# Patient Record
Sex: MALE | Race: WHITE | NOT HISPANIC OR LATINO | Employment: FULL TIME | URBAN - METROPOLITAN AREA
[De-identification: names, ages, dates, MRNs, and addresses within clinical notes are randomized per-mention and may not be internally consistent; named-entity substitution may affect disease eponyms.]

---

## 2019-03-02 ENCOUNTER — HOSPITAL ENCOUNTER (OUTPATIENT)
Dept: OTHER | Facility: HOSPITAL | Age: 29
Discharge: HOME | End: 2019-03-02

## 2019-03-02 LAB
ANION GAP SERPL CALC-SCNC: 14 MMOL/L (ref 8–19)
BUN SERPL-MCNC: 11 MG/DL (ref 5–25)
BUN/CREAT SERPL: 14 {RATIO} (ref 6–20)
CALCIUM SERPL-MCNC: 9 MG/DL (ref 8.7–10.4)
CHLORIDE SERPL-SCNC: 100 MMOL/L (ref 99–111)
CONV CO2: 28 MMOL/L (ref 22–32)
CREAT UR-MCNC: 0.76 MG/DL (ref 0.7–1.2)
EST. AVERAGE GLUCOSE BLD GHB EST-MCNC: 226 MG/DL
GFR SERPLBLD BASED ON 1.73 SQ M-ARVRAT: >60 ML/MIN/{1.73_M2}
GLUCOSE SERPL-MCNC: 326 MG/DL (ref 70–99)
HBA1C MFR BLD: 9.5 % (ref 3.5–5.7)
OSMOLALITY SERPL CALC.SUM OF ELEC: 298 MOSM/KG (ref 273–304)
POTASSIUM SERPL-SCNC: 4.2 MMOL/L (ref 3.5–5.3)
SODIUM SERPL-SCNC: 138 MMOL/L (ref 135–147)
TSH SERPL-ACNC: 1.23 M[IU]/L (ref 0.27–4.2)

## 2020-04-29 ENCOUNTER — OFFICE VISIT CONVERTED (OUTPATIENT)
Dept: DIABETES SERVICES | Facility: HOSPITAL | Age: 30
End: 2020-04-29
Attending: NURSE PRACTITIONER

## 2020-06-11 ENCOUNTER — HOSPITAL ENCOUNTER (OUTPATIENT)
Dept: DIABETES SERVICES | Facility: HOSPITAL | Age: 30
Discharge: HOME | End: 2020-06-11
Attending: NURSE PRACTITIONER

## 2020-06-11 ENCOUNTER — OFFICE VISIT CONVERTED (OUTPATIENT)
Dept: DIABETES SERVICES | Facility: HOSPITAL | Age: 30
End: 2020-06-11
Attending: NURSE PRACTITIONER

## 2020-09-15 ENCOUNTER — HOSPITAL ENCOUNTER (OUTPATIENT)
Dept: OTHER | Facility: HOSPITAL | Age: 30
Discharge: HOME | End: 2020-09-15
Attending: NURSE PRACTITIONER

## 2020-09-15 LAB
EST. AVERAGE GLUCOSE BLD GHB EST-MCNC: 212 MG/DL
HBA1C MFR BLD: 9 % (ref 3.5–5.7)

## 2020-09-16 ENCOUNTER — HOSPITAL ENCOUNTER (OUTPATIENT)
Dept: DIABETES SERVICES | Facility: HOSPITAL | Age: 30
Discharge: HOME | End: 2020-09-16
Attending: NURSE PRACTITIONER

## 2020-09-16 ENCOUNTER — OFFICE VISIT CONVERTED (OUTPATIENT)
Dept: DIABETES SERVICES | Facility: HOSPITAL | Age: 30
End: 2020-09-16
Attending: NURSE PRACTITIONER

## 2020-11-04 ENCOUNTER — OFFICE VISIT CONVERTED (OUTPATIENT)
Dept: DIABETES SERVICES | Facility: HOSPITAL | Age: 30
End: 2020-11-04
Attending: NURSE PRACTITIONER

## 2021-05-28 VITALS
SYSTOLIC BLOOD PRESSURE: 144 MMHG | RESPIRATION RATE: 20 BRPM | DIASTOLIC BLOOD PRESSURE: 70 MMHG | BODY MASS INDEX: 23.77 KG/M2 | WEIGHT: 169.75 LBS | HEIGHT: 71 IN

## 2021-05-28 NOTE — PROGRESS NOTES
Patient: ERIC BANUELOS     Acct: [id number]     Report: #[id number]  UNIT #: [id number]     : 1990    Encounter Date:2020  PRIMARY CARE:   ***ISigned***  --------------------------------------------------------------------------------------------------------------------  Date of Encounter      2020            Chief Complaint      DIABETES MELLITUS TYPE I            Referring Providers/Copies To      Referring Provider:  LYRIC            Allergies      Coded Allergies:             NO KNOWN ALLERGIES (Verified , 11/15/16)            Medications      Last Reconciled on 5/3/20 11:14 pm by JOEL SKAGGS      Insulin Lispro (HumaLOG KWIKPEN 100 UNITS/ML) 100 Units/Ml Insuln.pen      20 UNITS SUBQ TID INSULIN, #1 BOX 5 Refills         Prov: JOEL SKAGGS         20       Insulin Glargine (Lantus SOLOSTAR) 100 Unit/1 Ml Insuln.pen      50 UNITS SUBQ HS, #1 BOX 5 Refills         Prov: JOEL SKAGGS         20       Lisinopril* (Lisinopril*) Unknown Strength Tablet      PO QDAY, #30 TAB 0 Refills         Reported         20            Vital Signs      Height 5 ft 11 in / 180.34 cm      Weight 169 lbs 12.067 oz / 77.0 kg      BSA 1.97 m2      BMI 23.7 kg/m2      Respirations 20      Blood Pressure 144/70            Eye Exam      When was your last eye exam?      Not long ago but unknown      Where was it done?      [facility]            Preventative      Hx Influenza Vaccination:  Yes (LAST SEASON)      Date Influenza Vaccine Given:  Oct 1, 2019      Influenza Vaccine Declined:  No      Have You Had 2 or More Falls i:  No      Have You Had a Fall with an In:  No      Hx Pneumococcal Vaccination:  Yes ()      Encouraged to follow-up with:  PCP regarding preventative exams.      Chart initiated by:      Angella Guo MA            HPI - Diabetes      This patient is seen in the office today for follow-up evaluation for diabetes     medication management.  He is a  29-year-old male patient with a history of type     1 diabetes uncontrolled.  He is currently managed using Lantus 50 units once a     day and Humalog up to 20 units 3 times a day with meals.  He adjust his insulin     based on his carbohydrate intake and his glucose levels.  He brings blood     glucose logs to the office today indicating testing 3-4 times every day.      Glucose levels are widely fluctuating and ranging from as low as 45 mg/dL to as     high as 444 mg/dL.  The patient states that he just recently returned to work on    night shift and feels like this is why his glucose levels are fluctuating so     wildly.  He feels like he is trying to get his mealtimes reset and his insulin     dosing reset to accommodate the current work schedule.  Right now he states he     takes his Lantus at 930 to 10:00 in the evening and has been experiencing some     lows at work.  He may be experiencing a peak effect of his Lantus while he is at    work.              He denies any other health issues or concerns at this            Most Recent Lab Findings      Most Recent HGA1C      The patient had orders mailed to him after his last telehealth visit so he could    get a current A1c and urine microalbumin but he has not had this collected      Diabetes Type:  Type 1      Diabetes Complications:  Retinopathy (Reports some mild onset of)      Dietary Habits:  2 meals daily, Snacks, Diet Drinks      Exercise:  None      BG Monitoring:  Meter      Frequency:  Times per day (4-5)      Blood Glucose Range:         mg/dL her glucose logs            PAST,FAMILY,   Past Medical History      Past Medical History:  None            Past Surgical History      Past Surgical History:  None            Past Family History      TYPE 1 DM (Sister), TYPE 2 DM (Paternal grandfather and paternal great     grandfather)            Social History      Marrital Status:        Lives independently:  No      Number of Children:  2             Tobacco Use      Smoking status:  Former smoker            Vaping      Currently Vaping:  Yes            Alcohol Use      None            Substance Use      Substance Use:  Denies Use            Review of Sytems      General:  No Appetite Changes, No Fatigue, No Weight Loss, No Weight Gain      Eyes:  Negative for Blurred Vision; Positive for Corrective Lenses; Negative for    Vision Changes      Cardiovascular:  No Chest Pain, No Palpitations      Gastrointestinal:  No Constipation, No Diarrhea, No Nausea/Vomiting      Integumentary:  No Lesions, No Rash, No Wounds      Neurologic:  No Extremity Pain, No Numbness, No Tingling      Psychiatric:  No Anxiety, No Depression      Endocrine:  Hypoglycemia (He is having some lows while at work and with     increased physical activity); No Hypo Unawareness; Nocturnal Hypo; No Polyuria,     No Polyphagia, No Polydipsia            Exam      General:  Awake and Alert, Appropriately dressed/groomed, No Acute Distress,       Eyes:  Sclera Non-Icteric, EOM Intact, Eyelids without swelling,       Cardiovascular:  No Peripheral Edema, Normal Chest Appearance,       Musculoskeletal:  Steady Gait, Normal Strength, Normal ROM,       Respiratory:  No Respiratory Distress, No Cyanosis, No use of Accessory Musc,       Skin:  No Blisters, No Cuts\Scrapes, No Acanthosis Nigricans, No DM Dermopathy,     No Fungus, No NLD, No Rash, No Vitiligo      Psychiatric:  Appropriate Affect, Intact Judgement, Oriented x 3,             Impression      Type 1 diabetes uncontrolled with mild diabetic retinopathy            Diagnosis      TYPE 1 DIABETES MELLITUS WITH HYPERGLYCEMIA - E10.65            Notes      Renewed Medications      * INSULIN LISPRO (HumaLOG KWIKPEN 100 UNITS/ML) 100 UNITS/ML INSULN.PEN: 20       UNITS SUBQ TID INSULIN #1         Instructions: take up to 20 units with each meal         Dx: TYPE 1 DIABETES MELLITUS WITH HYPERGLYCEMIA - E10.65            Plan      At this time the  patient was instructed to decrease his Lantus to 45 units and     remove the time of the insulin to around the suppertime meal will prevent     hypoglycemia while he is at work.  If he has fasting elevated glucose levels he     will increase the Lantus back up to 50 units.  He will continue to monitor his     glucose levels 4-5 times each day.  We will schedule the patient for a return     visit in approximately 6 weeks for reevaluation.  No other changes were made.      He was reminded to get his lab work collected.            Patient Education      Patient Education Provided:  Yes            Topics Patient Counseled on      Meds            Electronically signed by JOEL SKAGGS  06/19/2020 16:20       Disclaimer: Converted document may not contain table formatting or lab diagrams. Please see [employer] for the authenticated document.

## 2021-05-28 NOTE — PROGRESS NOTES
Patient: ERIC BANUELOS     Acct: [id number]     Report: #[id number]  UNIT #: [id number]     : 1990    Encounter Date:2020  PRIMARY CARE:   ***ISigned***  --------------------------------------------------------------------------------------------------------------------  Date of Encounter      Sep 16, 2020            Chief Complaint      DIABETES MELLITUS TYPE I            Allergies      Coded Allergies:             NO KNOWN ALLERGIES (Verified , 11/15/16)            Medications      Last Reconciled on 20 8:45 am by JOEL SKAGGS      Insulin Lispro (HumaLOG KWIKPEN 100 UNITS/ML) 100 Units/Ml Insuln.pen      20 UNITS SUBQ TID INSULIN for 90 Days, #18 INSULN.PEN 3 Refills         Prov: JOEL SKAGGS         20       Insulin Glargine (Lantus SOLOSTAR) 100 Unit/1 Ml Insuln.pen      50 UNITS SUBQ HS, #1 BOX 5 Refills         Prov: JOEL SKAGGS         20       Lisinopril* (Lisinopril*) Unknown Strength Tablet      PO QDAY, #30 TAB 0 Refills         Reported         20            Eye Exam      When was your last eye exam?:              Where was it done?:        [facility]            Pain Score      Experiencing any pain?:  No            Preventative      Hx Influenza Vaccination:  No      Influenza Vaccine Declined:  Yes      Have You Had 2 or More Falls i:  No      Have You Had a Fall with an In:  No      Hx Pneumococcal Vaccination:  Yes ()      Encouraged to follow-up with:  PCP regarding preventative exams.      Chart initiated by:      Angella Guo MA            HPI - Diabetes      This patient is seen in the office today for follow-up evaluation for diabetes     medication management.  He is a 30-year-old male patient with history of type 1     diabetes complicated by diabetic retinopathy.  The patient reports his glucose     levels are remaining elevated in the 180-190 range on most occasions.  The     patient attributes this to a chaotic schedule since  resuming work.  He states he    is only getting approximately 4 hours of sleep most days and then gets up and     stays with his children and does home schooling then tries to get a little bit     more sleep before he goes back to work.  He is eating at random times.  He also     states he has a lot of stress and extra workload at work.  He denies any other     health issues or concerns since last being seen.            Most Recent Lab Findings      Most Recent HGA1C      His most recent A1c was collected on 9/15/2020 and was 9.0% indicating     uncontrolled type 1 diabetes.  This is, however an improvement in the A1c down     from 9.5%            Item Value  Date Time             Hemoglobin A1c 9.0 % H 9/15/20 0811            Diabetes Type:  Type 1      Diabetes Complications:  Retinopathy (He denies any changes in this condition)      Hospitalizations 2nd to DM?:  Yes      ER/911 Secondary to DM:  Yes      Dietary Habits:  2 meals daily, Snacks, Carb Count, Diet Drinks      Exercise:  None      BG Monitoring:  Meter      Frequency:  Times per day (3)      Blood Glucose Range:        staying in the 180-190's            PAST,FAMILY,   Past Medical History      Past Medical History:  None            Past Surgical History      Past Surgical History:  None      Other Past Surgical History      Ear tube placement            Past Family History      TYPE 1 DM, TYPE 2 DM            Social History      Marrital Status:        Lives independently:  No      Number of Children:  2      Occupation:  Plating Control            Tobacco Use      Smoking status:  Former smoker      Smoking history:  < 10 pack years            Vaping      Currently Vaping:  Yes            Alcohol Use      None            Substance Use      Substance Use:  Denies Use            Review of Sytems      General:  No Appetite Changes; Fatigue (He complains of generalized fatigue all     the time); No Weight Loss, No Weight Gain      Eyes:   Negative for Blurred Vision, Negative for Corrective Lenses, Negative for    Vision Changes      Cardiovascular:  No Chest Pain, No Palpitations      Gastrointestinal:  No Constipation, No Diarrhea, No Nausea/Vomiting      Integumentary:  No Lesions, No Rash, No Wounds      Neurologic:  No Extremity Pain, No Numbness, No Tingling      Psychiatric:  No Anxiety, No Depression      Endocrine:  Hypoglycemia (He denies any problematic hypoglycemia but states he     has a rare occasion), Hypo Unawareness (He has a history of hypoglycemia unaware    ness), Nocturnal Hypo (He denies any current nocturnal hypoglycemia but has a     history); No Polyuria, No Polyphagia, No Polydipsia            Exam      General:  Awake and Alert, Appropriately dressed/groomed, No Acute Distress,       Eyes:  Sclera Non-Icteric, EOM Intact, Eyelids without swelling,       Cardiovascular:  No Peripheral Edema, Normal Chest Appearance, Hear Tones Normal    S1 S2,       Musculoskeletal:  Steady Gait, Normal Strength, Normal ROM,       Respiratory:  No Respiratory Distress, No Cyanosis, No use of Accessory Musc,       Skin:  No Blisters, No Cuts\Scrapes, No Acanthosis Nigricans, No DM Dermopathy,     No Fungus, No NLD, No Rash, No Vitiligo      Psychiatric:  Appropriate Affect, Intact Judgement, Oriented x 3,             Impression      Type 1 diabetes uncontrolled            Diagnosis      Notes      Changed Medications      * INSULIN LISPRO (HumaLOG KWIKPEN 100 UNITS/ML) 100 UNITS/ML INSULN.PEN:         From: 20 UNITS SUBQ TID INSULIN #1         To: 20 UNITS SUBQ TID INSULIN 90 Days #18         Instructions: take up to 20 units with each meal         Dx: TYPE 1 DIABETES MELLITUS WITH HYPERGLYCEMIA - E10.65            Plan      Patient was instructed to increase his Lantus to 55 units once a day and then     monitor glucose levels.  If pre-meal and fasting glucose levels remain above 150    he will increase again by 5 units in 5 days.  We will  schedule the patient for     follow-up appointment in our office in 6 to 8 weeks.  He will contact our office    if he has any problematic glucose levels.            Electronically signed by JOEL SKAGGS  09/16/2020 08:45       Disclaimer: Converted document may not contain table formatting or lab diagrams. Please see [employer] for the authenticated document.

## 2021-05-28 NOTE — PROGRESS NOTES
Patient: MOY BANUELOS     Acct: [id number]     Report: #[id number]  UNIT #: [id number]     : 1990    Encounter Date:2020  PRIMARY CARE:   ***Signed***  --------------------------------------------------------------------------------------------------------------------  History of Present Illness            Chief Complaint: Type 1 diabetes uncontrolled            Moy Banuelos is presenting for evaluation via Video and Audio conferencing.     Verbal consent obtained via Video and Audio before beginning the visit.            Provider spent 28 minutes with the patient during telehealth visit.            The following staff were present during the visit: KENJI Gaspar and     Robert Nielsen RN                         Overview of Symptoms      This patient is seen in the office today for evaluation for diabetes medication     management.  He is a 29-year-old male patient with a history of type 1 diabetes     uncontrolled.  He has a 15-year history of type 1 diabetes.  This patient was     seen several years ago in our office for insulin pump management but he is no     longer using the pump.  He has most recently been managed by Dr. Cochran who     recently retired.  He is asking us to resume care of his diabetes.            The patient states he is currently managed using Lantus 50 units every day and     Humalog 10 to 20 units with each meal based on his carbohydrate intake and blood    glucose levels.  He states he is testing his blood glucose 4-5 times each day.      Typically glucose levels have run between 150 and 200 however the patient states    that he has been out of his Lantus insulin for 2 days now and his fasting     glucose levels are above 200.  He states random glucose levels are running as     normal since he is using his Humalog.  He reports that he has rare episodes of     hypoglycemia.            He states he eats approximately 2 meals a day with occasional snacks and  denies     use of sugary drinks.            He reports he has been recently diagnosed with some retinopathy in the right eye    but otherwise denies other complications due to his diabetes.  He denies any     recent hospitalizations or emergency department visits due to his diabetes.  He     does states he is currently recovering from drainage of a pilonidal cyst on his     tailbone.            Allergies and Medications      Allergies:        Coded Allergies:             NO KNOWN ALLERGIES (Verified , 11/15/16)      Medications    Last Reconciled on 5/3/20 11:14 pm by JOEL SKAGGS      Insulin Lispro (HumaLOG KWIKPEN 100 UNITS/ML) 100 Units/Ml Insuln.pen      20 UNITS SUBQ TID INSULIN, #1 BOX 0 Refills         Prov: JOEL SKAGGS         4/29/20       Insulin Glargine (Lantus SOLOSTAR) 100 Unit/1 Ml Insuln.pen      50 UNITS SUBQ HS, #1 BOX 5 Refills         Prov: JOEL SKAGGS         4/29/20       Lisinopril* (Lisinopril*) Unknown Strength Tablet      PO QDAY, #30 TAB 0 Refills         Reported         4/29/20            Past Medical,Surg,Family Hx      Past Medical History:  Diabetes Type 1      Family History:  Type II Dm (maternal grandfather; paternal great grandfather)      Other History      pilonidal cyst            Social History      Smoking status:  Former smoker      Currently Vaping:  Yes            Review of Systems      General:  Denies: Appetite Change, Fatigue, Fever, Night Sweats, Weight Gain,     Weight Loss      ENT:  Denies Headache, Denies Hearing Loss, Denies Hoarseness, Denies Sore     Throat      Eye:  Denies Blurred Vision, Denies Corrective Lenses, Denies Diplopia, Denies     Vision Changes      Cardiovascular:  Denies Chest Pain, Denies Palpitations      Respiratory:  Denies: Cough, Coughing Blood, Productive Cough, Shortness of Air,    Wheezing      Gastrointestinal:  Denies Bloody Stools, Denies Constipation, Denies Diarrhea,     Denies Nausea/Vomiting, Denies Problem Swallowing,  Denies Unable to Control [place]    els      Genitourinary:  Denies Blood in Urine, Denies Incontinence, Denies Painful     Urination      Musculoskeletal:  Denies Back Pain, Denies Muscle Pain, Denies Painful Joints      Integumentary:  Denies Itching, Denies Lesions, Denies Rash      Neurologic:  Denies Dizziness, Denies Numbness\Tingling, Denies Seizures      Psychiatric:  Denies Anxiety, Denies Depression      Endocrine:  Denies Cold Intolerance, Denies Heat Intolerance      Hematologic/Lymphatic:  Denies Bruising, Denies Bleeding, Denies Enlarged Lymph     Nodes      Other      He is currently employed at [employer]; he is  with 5 children; he denies     use of alcohol or recreational drugs            Exam      Constitutional/Appearance:  Well Nourished, No Acute Distress      Head/Face:  Atraumatic      Eyes:  Extracocular move intact, No Scleral Icterus      Respiratory:  Breathing comfortably, No Cough      Skin: General Appearance:  No Visable Rashes on face, No Lesions on face      Neurologic Orientation:  Grossly orientated to Person, Place, No Facial Drop      Psychiatric:  Normal Mood, Normal Affect            Plan and Patient Instructions      Plan      The patient was instructed to begin using the Humalog insulin with a 1-10     carbohydrate ratio and a 1 to 25 mg/dL correction for glucose levels above 150     mg/dL.  He is to continue monitoring his glucose levels 4-5 times each day and     is asked to log them for review with the patient in approximately 2 weeks.  We     will schedule the patient for a follow-up appointment in 6 weeks however.  In     the meantime we will collect a hemoglobin A1c and urine microalbumin.  Refills     for his Lantus and his Humalog were submitted to his pharmacy.      Instructions      * Chronic conditions reviewed and taken into consideration for today's treatment      plan.      * Patient instructed to seek medical attention urgently for new or worsening        symptoms.      * Patient was educated/instructed on their diagnosis, treatment and medications       prior to discharge from the Video and Audio visit today.            Electronically signed by JOEL SKAGGS  05/03/2020 23:14       Disclaimer: Converted document may not contain table formatting or lab diagrams. Please see [employer] for the authenticated document.

## 2021-07-01 ENCOUNTER — OFFICE VISIT (OUTPATIENT)
Dept: DIABETES SERVICES | Facility: HOSPITAL | Age: 31
End: 2021-07-01

## 2021-07-01 VITALS
WEIGHT: 166.89 LBS | DIASTOLIC BLOOD PRESSURE: 80 MMHG | HEIGHT: 71 IN | OXYGEN SATURATION: 99 % | SYSTOLIC BLOOD PRESSURE: 118 MMHG | TEMPERATURE: 99 F | HEART RATE: 80 BPM | BODY MASS INDEX: 23.36 KG/M2

## 2021-07-01 DIAGNOSIS — E10.9 TYPE 1 DIABETES MELLITUS WITHOUT COMPLICATION (HCC): Primary | ICD-10-CM

## 2021-07-01 LAB
GLUCOSE BLDC GLUCOMTR-MCNC: 69 MG/DL (ref 70–130)
HBA1C MFR BLD: 9.2 %

## 2021-07-01 PROCEDURE — 82962 GLUCOSE BLOOD TEST: CPT | Performed by: NURSE PRACTITIONER

## 2021-07-01 PROCEDURE — 99214 OFFICE O/P EST MOD 30 MIN: CPT | Performed by: NURSE PRACTITIONER

## 2021-07-01 PROCEDURE — 83036 HEMOGLOBIN GLYCOSYLATED A1C: CPT | Performed by: NURSE PRACTITIONER

## 2021-07-01 PROCEDURE — G0463 HOSPITAL OUTPT CLINIC VISIT: HCPCS | Performed by: NURSE PRACTITIONER

## 2021-07-01 RX ORDER — PROCHLORPERAZINE 25 MG/1
1 SUPPOSITORY RECTAL
Qty: 1 EACH | Refills: 3 | Status: SHIPPED | OUTPATIENT
Start: 2021-07-01 | End: 2021-07-09 | Stop reason: SDUPTHER

## 2021-07-01 RX ORDER — PROCHLORPERAZINE 25 MG/1
1 SUPPOSITORY RECTAL ONCE
Qty: 1 EACH | Refills: 0 | Status: SHIPPED | OUTPATIENT
Start: 2021-07-01 | End: 2021-07-01

## 2021-07-01 RX ORDER — PROCHLORPERAZINE 25 MG/1
SUPPOSITORY RECTAL
Qty: 3 EACH | Refills: 11 | Status: SHIPPED | OUTPATIENT
Start: 2021-07-01 | End: 2021-07-09 | Stop reason: SDUPTHER

## 2021-07-01 RX ORDER — INSULIN LISPRO 100 [IU]/ML
10 INJECTION, SOLUTION INTRAVENOUS; SUBCUTANEOUS
COMMUNITY
End: 2021-08-18

## 2021-07-01 RX ORDER — INSULIN DETEMIR 100 [IU]/ML
60 INJECTION, SOLUTION SUBCUTANEOUS DAILY
COMMUNITY
Start: 2021-03-26 | End: 2021-08-18

## 2021-07-01 NOTE — PROGRESS NOTES
Chief Complaint  Diabetes    Referred By: Self Referring    Subjective          Moy Granados presents to [facility] for diabetes medication management    History of Present Illness    Visit type:  follow-up  Diabetes type:  Type 1  Current diabetes status/concerns/issues: He denies any concerns regarding his diabetes today.  He does express interest in an insulin pump.  He has been reviewing different devices and expresses an interest in the tandem insulin pump with a Dexcom continuous glucose sensor.   Diabetes symptoms:  none reported at this time  Diabetes complications:  Retinopathy (small bleed behind right eye now resolved)  Hypoglycemia:  Occasional - had low glucose of 69 upon arrival to office; treated with sprite and peanut butter crackers  Hypoglycemia Symptoms:  shaking/tremors and sweaty  Current diabetes treatment: He is using Levemir 60 units once a day and Humalog with a 1-10 carbohydrate ratio and a 1-25 correction for glucose levels greater than 125  Blood glucose device:  Meter  Blood glucose monitoring frequency:  3 - 4  Blood glucose range/average:  150-200+  Diet:  Carbohydrate Counting, Diet drinks only  Activity:  very busy at work    Past Medical History:   Diagnosis Date   • Type 1 diabetes (CMS/[facility])      Past Surgical History:   Procedure Laterality Date   • EAR TUBES       Family History   Problem Relation Age of Onset   • Diabetes type I Other    • Diabetes type II Other      Social History     Socioeconomic History   • Marital status:      Spouse name: Not on file   • Number of children: 2   • Years of education: Not on file   • Highest education level: Not on file   Tobacco Use   • Smoking status: Former Smoker   • Smokeless tobacco: Current User     Types: Snuff   • Tobacco comment: <10 PACK YEARS   Vaping Use   • Vaping Use: Every day   Substance and Sexual Activity   • Alcohol use: Not Currently   • Drug use: Not Currently     No Known  Allergies    Current Outpatient Medications:   •  Insulin Lispro, 1 Unit Dial, (HUMALOG) 100 UNIT/ML solution pen-injector, Inject 10 Units under the skin into the appropriate area as directed 3 (Three) Times a Day With Meals. Use carb ratio of 1:10; 1:25 for above 125, Disp: , Rfl:   •  Continuous Blood Gluc  (Dexcom G6 ) device, 1 each 1 (One) Time for 1 dose., Disp: 1 each, Rfl: 0  •  Continuous Blood Gluc Sensor (Dexcom G6 Sensor), Every 10 (Ten) Days., Disp: 3 each, Rfl: 11  •  Continuous Blood Gluc Transmit (Dexcom G6 Transmitter) misc, 1 each Every 3 (Three) Months., Disp: 1 each, Rfl: 3  •  Levemir FlexTouch 100 UNIT/ML injection, Inject 60 Units under the skin into the appropriate area as directed Daily., Disp: , Rfl:     Review of Systems   Constitutional: Negative for activity change, appetite change, fatigue, fever, unexpected weight gain and unexpected weight loss.   HENT: Negative for congestion, ear pain, facial swelling, hearing loss, sore throat and tinnitus.    Eyes: Negative for blurred vision, double vision, redness and visual disturbance.   Respiratory: Negative for cough, shortness of breath and wheezing.    Cardiovascular: Negative for chest pain, palpitations and leg swelling.   Gastrointestinal: Negative for abdominal distention, constipation, diarrhea, nausea, vomiting, GERD and indigestion.   Endocrine: Negative for polydipsia, polyphagia and polyuria.   Genitourinary: Negative for difficulty urinating, frequency and urgency.   Musculoskeletal: Negative for back pain, gait problem and myalgias.   Skin: Negative for rash, skin lesions and bruise.   Neurological: Negative for seizures, speech difficulty, weakness, headache and confusion.   Psychiatric/Behavioral: Negative for sleep disturbance, depressed mood and stress. The patient is not nervous/anxious.         Objective     Vitals:    07/01/21 0816   BP: 118/80   BP Location: Right arm   Patient Position: Sitting   Cuff  "Size: Adult   Pulse: 80   Temp: 99 °F (37.2 °C)   TempSrc: Temporal   SpO2: 99%   Weight: 75.7 kg (166 lb 14.2 oz)   Height: 180.3 cm (71\")   PainSc: 0-No pain     Body mass index is 23.28 kg/m².      Physical Exam  Constitutional:       Appearance: Normal appearance. He is normal weight.   HENT:      Head: Normocephalic and atraumatic.      Right Ear: External ear normal.      Left Ear: External ear normal.      Nose: Nose normal.   Eyes:      Extraocular Movements: Extraocular movements intact.      Conjunctiva/sclera: Conjunctivae normal.   Pulmonary:      Effort: Pulmonary effort is normal.   Musculoskeletal:         General: Normal range of motion.      Cervical back: Normal range of motion.   Skin:     General: Skin is warm and dry.   Neurological:      General: No focal deficit present.      Mental Status: He is alert and oriented to person, place, and time. Mental status is at baseline.   Psychiatric:         Mood and Affect: Mood normal.         Behavior: Behavior normal.         Thought Content: Thought content normal.         Judgment: Judgment normal.         Result Review :   The following data was reviewed by: KENJI Girard on 07/01/2021:        A point-of-care A1c was collected in the office today was 9.2% indicating uncontrolled type 1 diabetes.  This is up slightly from a prior result of 9.0% collected in September 2020    Most Recent A1C    HGBA1C Most Recent 7/1/21   Hemoglobin A1C 9.2             A1C Last 3 Results    HGBA1C Last 3 Results 9/15/20 7/1/21   Hemoglobin A1C 9.0 (A) 9.2   (A) Abnormal value       Comments are available for some flowsheets but are not being displayed.                   Assessment:  Diagnoses and all orders for this visit:    1. Type 1 diabetes mellitus without complication (CMS/[facility]) (Primary)  -     POC Glycosylated Hemoglobin (Hb A1C)    Other orders  -     POC Glucose  -     Continuous Blood Gluc Sensor (Dexcom G6 Sensor); Every 10 (Ten) Days.  Dispense: " 3 each; Refill: 11  -     Continuous Blood Gluc  (Dexcom G6 ) device; 1 each 1 (One) Time for 1 dose.  Dispense: 1 each; Refill: 0  -     Continuous Blood Gluc Transmit (Dexcom G6 Transmitter) misc; 1 each Every 3 (Three) Months.  Dispense: 1 each; Refill: 3        Plan: No changes were made to the patient's treatment plan.  We will submit request to his insurance for the Dexcom continuous glucose sensor and a tandem insulin pump.  The patient will call the office to set up time for training upon receipt of the devices.  He will then be scheduled for a follow-up appointment in our office.    The patient will monitor his blood glucose levels 3-4 times each day.  If he experiences any increased frequency or severity of hypoglycemia, or worsening hyperglycemia, he will contact the office for further instructions.          Follow Up     Return will call when pump or CGM received.    Patient was given instructions and counseling regarding his condition or for health maintenance advice. Please see specific information pulled into the AVS if appropriate.     Kym Alfaro, KENJI  07/01/2021

## 2021-07-09 RX ORDER — PROCHLORPERAZINE 25 MG/1
SUPPOSITORY RECTAL
Qty: 3 EACH | Refills: 11 | Status: SHIPPED | OUTPATIENT
Start: 2021-07-09 | End: 2022-08-02

## 2021-07-09 RX ORDER — PROCHLORPERAZINE 25 MG/1
1 SUPPOSITORY RECTAL ONCE
Qty: 1 EACH | Refills: 0 | Status: SHIPPED | OUTPATIENT
Start: 2021-07-09 | End: 2021-07-09

## 2021-07-09 RX ORDER — PROCHLORPERAZINE 25 MG/1
1 SUPPOSITORY RECTAL
Qty: 1 EACH | Refills: 3 | Status: SHIPPED | OUTPATIENT
Start: 2021-07-09 | End: 2022-09-14

## 2021-08-04 ENCOUNTER — EDUCATION (OUTPATIENT)
Dept: DIABETES SERVICES | Facility: HOSPITAL | Age: 31
End: 2021-08-04

## 2021-08-04 DIAGNOSIS — IMO0002 DIABETES MELLITUS TYPE 1, UNCONTROLLED, WITH COMPLICATIONS: Primary | ICD-10-CM

## 2021-08-04 NOTE — PROGRESS NOTES
Moy Ross Granados 31 y.o. presents for inulin pump instructions.  Patient is currently using Dexcom CGM.  Patient was explained and demonstrated in the use of the tandem insulin pump.  Tandem clinical check list was followed and completed.  15-15 hypoglycemic treatment rule was explained.  Patient was instructed to talk to Kym PHILLIP regarding rescue glucagon.  Pamphlets were given to patient regarding rescue glucagon.  [facility] staff contact information was given to patient.  Patient was encouraged to contact staff with problems or concerns.        Time started: 9: 30    Time ended: 10: 30    Isabelle Ewing RN, BSN  08/04/2021

## 2021-08-18 ENCOUNTER — OFFICE VISIT (OUTPATIENT)
Dept: DIABETES SERVICES | Facility: HOSPITAL | Age: 31
End: 2021-08-18

## 2021-08-18 VITALS
BODY MASS INDEX: 24.28 KG/M2 | WEIGHT: 179.23 LBS | SYSTOLIC BLOOD PRESSURE: 119 MMHG | HEART RATE: 88 BPM | TEMPERATURE: 98.4 F | DIASTOLIC BLOOD PRESSURE: 61 MMHG | OXYGEN SATURATION: 100 % | HEIGHT: 72 IN

## 2021-08-18 DIAGNOSIS — E10.65 UNCONTROLLED TYPE 1 DIABETES MELLITUS WITH HYPERGLYCEMIA (HCC): Primary | ICD-10-CM

## 2021-08-18 PROCEDURE — 99213 OFFICE O/P EST LOW 20 MIN: CPT | Performed by: NURSE PRACTITIONER

## 2021-08-18 PROCEDURE — 95251 CONT GLUC MNTR ANALYSIS I&R: CPT | Performed by: NURSE PRACTITIONER

## 2021-08-18 PROCEDURE — G0463 HOSPITAL OUTPT CLINIC VISIT: HCPCS | Performed by: NURSE PRACTITIONER

## 2021-08-18 RX ORDER — ACETAMINOPHEN 500 MG
500 TABLET ORAL EVERY 6 HOURS PRN
COMMUNITY

## 2021-08-18 RX ORDER — GLUCAGON INJECTION, SOLUTION 0.5 MG/.1ML
0.5 INJECTION, SOLUTION SUBCUTANEOUS AS NEEDED
Qty: 0.1 ML | Refills: 3 | Status: SHIPPED | OUTPATIENT
Start: 2021-08-18

## 2021-08-18 NOTE — PROGRESS NOTES
Chief Complaint  Diabetes (follow up/post pump start, no other concerns at this time. )    Referred By: Self Referring    Subjective          Moy Granados presents to [facility] for insulin pump management    History of Present Illness    Visit type:  follow-up  Diabetes type:  Type 1  Current diabetes status/concerns/issues:  He is here today for f/u after starting the Tandem pump  Other health concerns: none reported  Diabetes symptoms:  none reported at this time  Diabetes complications:  Retinopathy (small bleed behind right eye now resolved)  Hypoglycemia:  Frequent - per CGM report  Hypoglycemia Symptoms:  shaking/tremors  Current diabetes treatment:  Tandem insulin pump using Humalog insulin.  He is using around 70 units a day  Blood glucose device:  Dexcom CGM  Blood glucose monitoring frequency:  Continuous per CGM  Blood glucose range/average: The 14-day insulin pump report indicates an average sensor glucose of 143 mg/dL with a high of 319 and a low of 40 mg/dL.  He is using control IQ technology 95% the time.  71% of glucose levels are in target range between 70 and 180 while 23% are above target and 6% are below target.  The majority of hypoglycemic events occurred in the first couple days of being on the pump but have since minimized to some extent.  Some of the hyperglycemic events are due to delays and entering carbohydrate related insulin boluses  Diet:  Carbohydrate Counting, Diet drinks only  Activity:  None    Past Medical History:   Diagnosis Date   • Type 1 diabetes (CMS/[facility])      Past Surgical History:   Procedure Laterality Date   • EAR TUBES       Family History   Problem Relation Age of Onset   • Diabetes type I Other    • Diabetes type II Other      Social History     Socioeconomic History   • Marital status:      Spouse name: Not on file   • Number of children: 2   • Years of education: Not on file   • Highest education level: Not on file    Tobacco Use   • Smoking status: Former Smoker     Packs/day: 0.50     Types: Cigarettes     Quit date: 2019     Years since quittin.6   • Smokeless tobacco: Current User     Types: Snuff   Vaping Use   • Vaping Use: Every day   • Substances: Flavoring   • Devices: Refillable tank   Substance and Sexual Activity   • Alcohol use: Not Currently   • Drug use: Never   • Sexual activity: Defer     No Known Allergies    Current Outpatient Medications:   •  acetaminophen (TYLENOL) 500 MG tablet, Take 500 mg by mouth Every 6 (Six) Hours As Needed for Mild Pain  (prn for head aches and pther minor pain)., Disp: , Rfl:   •  Continuous Blood Gluc Sensor (Dexcom G6 Sensor), Every 10 (Ten) Days., Disp: 3 each, Rfl: 11  •  Continuous Blood Gluc Transmit (Dexcom G6 Transmitter) misc, 1 each Every 3 (Three) Months., Disp: 1 each, Rfl: 3  •  Glucagon (Gvoke HypoPen 2-Pack) 0.5 MG/0.1ML solution auto-injector, Inject 0.5 mg under the skin into the appropriate area as directed As Needed (use for severe hypoglycemia)., Disp: 0.1 mL, Rfl: 3  •  insulin lispro (humaLOG) 100 UNIT/ML injection, Use up to 80 units per day per insulin pump, Disp: 30 mL, Rfl: 5    Review of Systems   Constitutional: Negative for activity change, appetite change, fatigue, fever, unexpected weight gain and unexpected weight loss.   HENT: Negative for congestion, ear pain, facial swelling, hearing loss, sore throat and tinnitus.    Eyes: Negative for blurred vision, double vision, redness and visual disturbance.   Respiratory: Negative for cough, shortness of breath and wheezing.    Cardiovascular: Negative for chest pain, palpitations and leg swelling.   Gastrointestinal: Negative for abdominal distention, constipation, diarrhea, nausea, vomiting, GERD and indigestion.   Endocrine: Negative for polydipsia, polyphagia and polyuria.   Genitourinary: Negative for difficulty urinating, frequency and urgency.   Musculoskeletal: Negative for back pain, gait  "problem and myalgias.   Skin: Negative for rash, skin lesions and bruise.   Neurological: Negative for seizures, speech difficulty, weakness, headache and confusion.   Psychiatric/Behavioral: Negative for sleep disturbance, depressed mood and stress. The patient is not nervous/anxious.         Objective     Vitals:    08/18/21 0825   BP: 119/61   BP Location: Right arm   Patient Position: Sitting   Pulse: 88   Temp: 98.4 °F (36.9 °C)   SpO2: 100%   Weight: 81.3 kg (179 lb 3.7 oz)   Height: 182.9 cm (72\")   PainSc: 0-No pain     Body mass index is 24.31 kg/m².      Physical Exam  Constitutional:       Appearance: Normal appearance. He is normal weight.   HENT:      Head: Normocephalic and atraumatic.      Right Ear: External ear normal.      Left Ear: External ear normal.      Nose: Nose normal.   Eyes:      Extraocular Movements: Extraocular movements intact.      Conjunctiva/sclera: Conjunctivae normal.   Pulmonary:      Effort: Pulmonary effort is normal.   Musculoskeletal:         General: Normal range of motion.      Cervical back: Normal range of motion.   Skin:     General: Skin is warm and dry.   Neurological:      General: No focal deficit present.      Mental Status: He is alert and oriented to person, place, and time. Mental status is at baseline.   Psychiatric:         Mood and Affect: Mood normal.         Behavior: Behavior normal.         Thought Content: Thought content normal.         Judgment: Judgment normal.         Result Review :   The following data was reviewed by: KENJI Girard on 08/18/2021:        His most recent A1c was collected on July 1, 2021 was 9.2% indicating uncontrolled type 1 diabetes    Most Recent A1C    HGBA1C Most Recent 7/1/21   Hemoglobin A1C 9.2             A1C Last 3 Results    HGBA1C Last 3 Results 9/15/20 7/1/21   Hemoglobin A1C 9.0 (A) 9.2   (A) Abnormal value       Comments are available for some flowsheets but are not being displayed.                 "   Assessment:  Diagnoses and all orders for this visit:    1. Uncontrolled type 1 diabetes mellitus with hyperglycemia (CMS/[facility]) (Primary)    Other orders  -     insulin lispro (humaLOG) 100 UNIT/ML injection; Use up to 80 units per day per insulin pump  Dispense: 30 mL; Refill: 5  -     Glucagon (Gvoke HypoPen 2-Pack) 0.5 MG/0.1ML solution auto-injector; Inject 0.5 mg under the skin into the appropriate area as directed As Needed (use for severe hypoglycemia).  Dispense: 0.1 mL; Refill: 3        Plan: We elected to make no changes to the patient's pump settings at this time.  He will focus on timing with his carbohydrate related boluses.  He was given a prescription for glucagon emergency kit.  He was instructed on the use of the device.  We will have the patient return in 2 to 3 weeks for reevaluation to see if some of the hypoglycemia and hyperglycemia have resolved.    The patient will monitor his blood glucose levels using his continuous glucose sensor.  If he experiences any increased frequency or severity of hypoglycemia, or worsening hyperglycemia, he will contact the office for further instructions.          Follow Up     Return in about 3 weeks (around 9/8/2021) for Pump Eval, CGM Follow-up.    Patient was given instructions and counseling regarding his condition or for health maintenance advice. Please see specific information pulled into the AVS if appropriate.     Kym Alfaro, KENJI  08/18/2021

## 2021-09-15 ENCOUNTER — OFFICE VISIT (OUTPATIENT)
Dept: DIABETES SERVICES | Facility: HOSPITAL | Age: 31
End: 2021-09-15

## 2021-09-15 VITALS
HEART RATE: 85 BPM | WEIGHT: 186.51 LBS | BODY MASS INDEX: 25.26 KG/M2 | SYSTOLIC BLOOD PRESSURE: 129 MMHG | OXYGEN SATURATION: 97 % | DIASTOLIC BLOOD PRESSURE: 72 MMHG | TEMPERATURE: 97.8 F | HEIGHT: 72 IN

## 2021-09-15 DIAGNOSIS — E10.65 UNCONTROLLED TYPE 1 DIABETES MELLITUS WITH HYPERGLYCEMIA (HCC): Primary | ICD-10-CM

## 2021-09-15 PROCEDURE — G0463 HOSPITAL OUTPT CLINIC VISIT: HCPCS | Performed by: NURSE PRACTITIONER

## 2021-09-15 PROCEDURE — 95251 CONT GLUC MNTR ANALYSIS I&R: CPT | Performed by: NURSE PRACTITIONER

## 2021-09-15 PROCEDURE — 99213 OFFICE O/P EST LOW 20 MIN: CPT | Performed by: NURSE PRACTITIONER

## 2021-09-15 NOTE — PROGRESS NOTES
Chief Complaint  Diabetes (med refills, no other concerns at this time )    Referred By: Self Referring    Subjective          Moy Granados presents to [facility] for insulin pump management    History of Present Illness    Visit type:  follow-up  Diabetes type:  Type 1  Current diabetes status/concerns/issues: He denies any specific concerns related to his diabetes at this time.  Other health concerns: None reported  Diabetes symptoms:    Polyuria: No   Polydipsia: No   Polyphagia:No   Blurred vision: No   Excessive fatigue: No  Diabetes complications:  Neuropathy:No  Nephropathy:No  Retinopathy:Yes, He had a small bleed in his right eye that has resolved  Amputation/Wounds:No  Gastroparesis:No  Cardiovascular Disease:No  Erectile Dysfunction:No  Hypoglycemia:  Level 1 hypoglycemia (54 mg/dL - 70 mg/dL); Frequency - The pump report indicates 2% of glucose levels are level 1 hypoglycemia  Hypoglycemia Symptoms:  shaking/tremors and hunger  Current diabetes treatment: He is using a tandem insulin pump with Humalog insulin.  He is using approximately 65 units of insulin each day  Blood glucose device:  Dexcom CGM  Blood glucose monitoring frequency:  Continuous per CGM  Blood glucose range/average:  The insulin pump report indicates an average sensor glucose of 146 mg/dL with a high of 338 and low of 45 mg/dL.  74% of glucose levels are falling in target range between 70 to 180 mg/dL while 24% are above target and 2% are below target.  He is using control IQ technology 96% of the time.  There is some postprandial hyperglycemia which is occurring because of failure to enter carbohydrates at some meals.  Diet:  Carbohydrate Counting, Avoids high carb/sweet foods, Diet drinks only  Activity:  He is very physically active with his work    Past Medical History:   Diagnosis Date   • Type 1 diabetes (CMS/HCC)      Past Surgical History:   Procedure Laterality Date   • EAR TUBES        Family History   Problem Relation Age of Onset   • Diabetes type I Other    • Diabetes type II Other      Social History     Socioeconomic History   • Marital status:      Spouse name: Not on file   • Number of children: 2   • Years of education: Not on file   • Highest education level: Not on file   Tobacco Use   • Smoking status: Former Smoker     Packs/day: 0.50     Types: Cigarettes     Quit date:      Years since quittin.7   • Smokeless tobacco: Current User     Types: Snuff   Vaping Use   • Vaping Use: Every day   • Substances: Flavoring   • Devices: Ref[employer]ide Technologyble tank   Substance and Sexual Activity   • Alcohol use: Not Currently   • Drug use: Never   • Sexual activity: Yes     Partners: Female     Comment: /wife      No Known Allergies    Current Outpatient Medications:   •  acetaminophen (TYLENOL) 500 MG tablet, Take 500 mg by mouth Every 6 (Six) Hours As Needed for Mild Pain  (prn for head aches and pther minor pain)., Disp: , Rfl:   •  Continuous Blood Gluc Sensor (Dexcom G6 Sensor), Every 10 (Ten) Days., Disp: 3 each, Rfl: 11  •  Continuous Blood Gluc Transmit (Dexcom G6 Transmitter) misc, 1 each Every 3 (Three) Months., Disp: 1 each, Rfl: 3  •  Glucagon (Gvoke HypoPen 2-Pack) 0.5 MG/0.1ML solution auto-injector, Inject 0.5 mg under the skin into the appropriate area as directed As Needed (use for severe hypoglycemia)., Disp: 0.1 mL, Rfl: 3  •  insulin lispro (humaLOG) 100 UNIT/ML injection, Use up to 80 units per day per insulin pump, Disp: 30 mL, Rfl: 5    Review of Systems   Constitutional: Negative for activity change, appetite change, fatigue, fever, unexpected weight gain and unexpected weight loss.   HENT: Negative for congestion, ear pain, facial swelling, hearing loss, sore throat and tinnitus.    Eyes: Negative for blurred vision, double vision, redness and visual disturbance.   Respiratory: Negative for cough, shortness of breath and wheezing.    Cardiovascular: Negative  "for chest pain, palpitations and leg swelling.   Gastrointestinal: Negative for abdominal distention, constipation, diarrhea, nausea, vomiting, GERD and indigestion.   Endocrine: Negative for polydipsia, polyphagia and polyuria.   Genitourinary: Negative for difficulty urinating, frequency and urgency.   Musculoskeletal: Negative for back pain, gait problem and myalgias.   Skin: Negative for rash, skin lesions and bruise.   Neurological: Negative for seizures, speech difficulty, weakness, headache and confusion.   Psychiatric/Behavioral: Negative for sleep disturbance, depressed mood and stress. The patient is not nervous/anxious.         Objective     Vitals:    09/15/21 0821   BP: 129/72   BP Location: Right arm   Patient Position: Sitting   Pulse: 85   Temp: 97.8 °F (36.6 °C)   SpO2: 97%   Weight: 84.6 kg (186 lb 8.2 oz)   Height: 182.9 cm (72\")   PainSc: 0-No pain     Body mass index is 25.3 kg/m².    Physical Exam  Constitutional:       Appearance: Normal appearance.      Comments: Overweight with BMI of 25.3   HENT:      Head: Normocephalic and atraumatic.      Right Ear: External ear normal.      Left Ear: External ear normal.      Nose: Nose normal.   Eyes:      Extraocular Movements: Extraocular movements intact.      Conjunctiva/sclera: Conjunctivae normal.   Pulmonary:      Effort: Pulmonary effort is normal.   Musculoskeletal:         General: Normal range of motion.      Cervical back: Normal range of motion.   Skin:     General: Skin is warm and dry.   Neurological:      General: No focal deficit present.      Mental Status: He is alert and oriented to person, place, and time. Mental status is at baseline.   Psychiatric:         Mood and Affect: Mood normal.         Behavior: Behavior normal.         Thought Content: Thought content normal.         Judgment: Judgment normal.         Result Review :   The following data was reviewed by: KENJI Girard on 09/15/2021:        His most recent A1c " was collected on 7/1/2021 and was 9.2% indicating uncontrolled type 1 diabetes.    Most Recent A1C    HGBA1C Most Recent 7/1/21   Hemoglobin A1C 9.2             A1C Last 3 Results    HGBA1C Last 3 Results 7/1/21   Hemoglobin A1C 9.2             Glucose   Date Value Ref Range Status   07/01/2021 69 (L) 70 - 130 mg/dL Final     Comment:     Serial Number: [id number]:  [id number]     His glucose level was low upon presentation of the office today.  He was provided carbohydrates during the visit and glucose level was on the rise as he was leaving to return home.       Assessment:  Diagnoses and all orders for this visit:    1. Uncontrolled type 1 diabetes mellitus with hyperglycemia (CMS/[facility]) (Primary)        Plan: No changes were made to his pump settings.  He is encouraged to enter all carbohydrates into his pump to prevent the postprandial excursions.  He will be scheduled for routine follow-up appointment in 3 months.    The patient will monitor his blood glucose levels using his continuous glucose sensor.  If he develops hypoglycemia or experiences any increased frequency or severity of hypoglycemia, he will contact the office for further instructions.        Follow Up     Return in about 3 months (around 12/15/2021) for Pump Eval, CGM Follow-up.    Patient was given instructions and counseling regarding his condition or for health maintenance advice. Please see specific information pulled into the AVS if appropriate.     Kym Alfaro, KENJI  09/15/2021

## 2021-12-15 ENCOUNTER — OFFICE VISIT (OUTPATIENT)
Dept: DIABETES SERVICES | Facility: HOSPITAL | Age: 31
End: 2021-12-15

## 2021-12-15 VITALS
SYSTOLIC BLOOD PRESSURE: 124 MMHG | RESPIRATION RATE: 22 BRPM | DIASTOLIC BLOOD PRESSURE: 72 MMHG | OXYGEN SATURATION: 100 % | BODY MASS INDEX: 25.14 KG/M2 | TEMPERATURE: 97.2 F | HEART RATE: 86 BPM | WEIGHT: 185.63 LBS | HEIGHT: 72 IN

## 2021-12-15 DIAGNOSIS — E10.9 CONTROLLED DIABETES MELLITUS TYPE 1 WITHOUT COMPLICATIONS (HCC): Primary | ICD-10-CM

## 2021-12-15 LAB
EXPIRATION DATE: ABNORMAL
GLUCOSE BLDC GLUCOMTR-MCNC: 66 MG/DL (ref 70–99)
HBA1C MFR BLD: 6.6 %
Lab: ABNORMAL

## 2021-12-15 PROCEDURE — 82962 GLUCOSE BLOOD TEST: CPT | Performed by: NURSE PRACTITIONER

## 2021-12-15 PROCEDURE — 99213 OFFICE O/P EST LOW 20 MIN: CPT | Performed by: NURSE PRACTITIONER

## 2021-12-15 PROCEDURE — 83036 HEMOGLOBIN GLYCOSYLATED A1C: CPT | Performed by: NURSE PRACTITIONER

## 2021-12-15 PROCEDURE — G0463 HOSPITAL OUTPT CLINIC VISIT: HCPCS | Performed by: NURSE PRACTITIONER

## 2021-12-15 NOTE — PROGRESS NOTES
Chief Complaint  Diabetes (follow up and med refills , pt needs a refill on his lantus bc he is not using his pump at this time, he also needs syringes, (daughter needed dental work,this came first before pump suplies as to why he is not on his pump at this time))    Referred By: Self Referring    Subjective          Moy Granados presents to [facility] for insulin pump management    History of Present Illness    Visit type:  follow-up  Diabetes type:  Type 1  Current diabetes status/concerns/issues: He is currently off of his pump with plans to resume the pump.  He had to wait to get supplies.  Other health concerns: None  reported  Diabetes symptoms:    Polyuria: No   Polydipsia: No   Polyphagia: No   Blurred vision: No   Excessive fatigue: No  Diabetes complications:  Neuropathy:No  Nephropathy:No  Retinopathy:Yes, He had a small bleed in his right eye that has resolved  Amputation/Wounds:No  Gastroparesis:No  Cardiovascular Disease:No  Erectile Dysfunction:No  Hypoglycemia:  His glucose was 66 upon arrival to the office today.  He states it has been quite sometime since his have an episode of hypoglycemia  Hypoglycemia Symptoms:  He does not feel the episode of hypoglycemia occurring at this time  Current diabetes treatment:  He is currently off of his tandem insulin pump for approximately 1 month.  He is taking Lantus 50 units once a day and Humalog using the previous carbohydrate ratio and correction dosing that his pump would ordinarily use.  Blood glucose device:  Dexcom CGM; he is off of his sensor right now  Blood glucose monitoring frequency:  Continuous per CGM; he is testing 3-4 times or more day  Blood glucose range/average: He states glucose levels are typically around 150  Diet:  Carbohydrate Counting, Avoids high carb/sweet foods  Activity/Exercise:  None    Past Medical History:   Diagnosis Date   • Type 1 diabetes (HCC)      Past Surgical History:   Procedure  Laterality Date   • EAR TUBES       Family History   Problem Relation Age of Onset   • Diabetes type I Other    • Diabetes type II Other      Social History     Socioeconomic History   • Marital status:    • Number of children: 2   Tobacco Use   • Smoking status: Former Smoker     Packs/day: 0.50     Types: Cigarettes     Quit date: 2019     Years since quittin.9   • Smokeless tobacco: Current User     Types: Snuff   Vaping Use   • Vaping Use: Every day   • Substances: Flavoring   • Devices: Refillable tank   Substance and Sexual Activity   • Alcohol use: Not Currently   • Drug use: Never   • Sexual activity: Yes     Partners: Female     Comment: /wife      No Known Allergies    Current Outpatient Medications:   •  acetaminophen (TYLENOL) 500 MG tablet, Take 500 mg by mouth Every 6 (Six) Hours As Needed for Mild Pain  (prn for head aches and pther minor pain)., Disp: , Rfl:   •  Continuous Blood Gluc Sensor (Dexcom G6 Sensor), Every 10 (Ten) Days., Disp: 3 each, Rfl: 11  •  Continuous Blood Gluc Transmit (Dexcom G6 Transmitter) misc, 1 each Every 3 (Three) Months., Disp: 1 each, Rfl: 3  •  Glucagon (Gvoke HypoPen 2-Pack) 0.5 MG/0.1ML solution auto-injector, Inject 0.5 mg under the skin into the appropriate area as directed As Needed (use for severe hypoglycemia)., Disp: 0.1 mL, Rfl: 3  •  insulin lispro (humaLOG) 100 UNIT/ML injection, Use up to 80 units per day per insulin pump, Disp: 30 mL, Rfl: 5  •  Insulin Glargine (LANTUS SOLOSTAR) 100 UNIT/ML injection pen, Inject 50 Units under the skin into the appropriate area as directed Every Night., Disp: 5 pen, Rfl: 2    Review of Systems   Constitutional: Negative for activity change, appetite change, fatigue, fever, unexpected weight gain and unexpected weight loss.   HENT: Negative for congestion, ear pain, facial swelling, hearing loss, sore throat and tinnitus.    Eyes: Negative for blurred vision, double vision, redness and visual disturbance.  "  Respiratory: Negative for cough, shortness of breath and wheezing.    Cardiovascular: Negative for chest pain, palpitations and leg swelling.   Gastrointestinal: Negative for abdominal distention, constipation, diarrhea, nausea, vomiting, GERD and indigestion.   Endocrine: Negative for polydipsia, polyphagia and polyuria.   Genitourinary: Negative for difficulty urinating, frequency and urgency.   Musculoskeletal: Negative for back pain, gait problem and myalgias.   Skin: Negative for rash, skin lesions and bruise.   Neurological: Negative for seizures, speech difficulty, weakness, headache and confusion.   Psychiatric/Behavioral: Negative for sleep disturbance, depressed mood and stress. The patient is not nervous/anxious.         Objective     Vitals:    12/15/21 0809   BP: 124/72   BP Location: Left arm   Patient Position: Sitting   Cuff Size: Adult   Pulse: 86   Resp: 22   Temp: 97.2 °F (36.2 °C)   SpO2: 100%   Weight: 84.2 kg (185 lb 10 oz)   Height: 182.9 cm (72\")   PainSc: 0-No pain     Body mass index is 25.18 kg/m².    Physical Exam  Constitutional:       Appearance: Normal appearance. He is normal weight.      Comments: Overweight with BMI of 25.18   HENT:      Head: Normocephalic and atraumatic.      Right Ear: External ear normal.      Left Ear: External ear normal.      Nose: Nose normal.   Eyes:      Extraocular Movements: Extraocular movements intact.      Conjunctiva/sclera: Conjunctivae normal.   Pulmonary:      Effort: Pulmonary effort is normal.   Musculoskeletal:         General: Normal range of motion.      Cervical back: Normal range of motion.   Skin:     General: Skin is warm and dry.   Neurological:      General: No focal deficit present.      Mental Status: He is alert and oriented to person, place, and time. Mental status is at baseline.   Psychiatric:         Mood and Affect: Mood normal.         Behavior: Behavior normal.         Thought Content: Thought content normal.         " Judgment: Judgment normal.         Result Review :   The following data was reviewed by: KENJI Girard on 12/15/2021:    Point-of-care A1c collected in the office today was 6.6% indicating controlled type 1 diabetes.  This is down from the prior result of 9.2% collected in July.    Most Recent A1C    HGBA1C Most Recent 12/15/21   Hemoglobin A1C 6.6             A1C Last 3 Results    HGBA1C Last 3 Results 7/1/21 12/15/21   Hemoglobin A1C 9.2 6.6             Glucose   Date Value Ref Range Status   12/15/2021 66 (L) 70 - 99 mg/dL Final     Comment:     Serial Number: [id number]:  [id number]           Assessment: Patient has had a significant improvement with his A1c over the last 4 months.  He is currently off of his pump due to cost of supplies and is taken multiple daily injections of insulin.  He plans to resume his pump as soon as his new supplies command.      Diagnoses and all orders for this visit:    1. Controlled diabetes mellitus type 1 without complications (HCC) (Primary)  -     POC Glycosylated Hemoglobin (Hb A1C)    Other orders  -     POC Glucose  -     Insulin Glargine (LANTUS SOLOSTAR) 100 UNIT/ML injection pen; Inject 50 Units under the skin into the appropriate area as directed Every Night.  Dispense: 5 pen; Refill: 2        Plan: No changes were made to his treatment plan today as he is well controlled now.  He is to stay focused on dietary strategies to control glucose levels as he has had some slight weight gain.  He will be scheduled for routine follow-up appointment in the next 3 months.    The patient will monitor his blood glucose levels using his continuous glucose sensor.  If he develops problematic hyperglycemia or hypoglycemia or adverse drug reaction, he will contact the office for further instructions.        Follow Up     Return in about 3 months (around 3/15/2022) for Pump Eval, CGM Follow-up.    Patient was given instructions and counseling regarding his condition or  for health maintenance advice. Please see specific information pulled into the AVS if appropriate.     Kym Alfaro, APRN  12/15/2021

## 2022-03-15 ENCOUNTER — OFFICE VISIT (OUTPATIENT)
Dept: DIABETES SERVICES | Facility: HOSPITAL | Age: 32
End: 2022-03-15

## 2022-03-15 VITALS
BODY MASS INDEX: 25.2 KG/M2 | OXYGEN SATURATION: 95 % | TEMPERATURE: 97.4 F | SYSTOLIC BLOOD PRESSURE: 136 MMHG | HEIGHT: 72 IN | DIASTOLIC BLOOD PRESSURE: 86 MMHG | WEIGHT: 186.07 LBS | HEART RATE: 97 BPM

## 2022-03-15 DIAGNOSIS — E10.65 UNCONTROLLED TYPE 1 DIABETES MELLITUS WITH HYPERGLYCEMIA: Primary | ICD-10-CM

## 2022-03-15 LAB
EXPIRATION DATE: ABNORMAL
GLUCOSE BLDC GLUCOMTR-MCNC: 50 MG/DL (ref 70–99)
HBA1C MFR BLD: 7.6 %
Lab: ABNORMAL

## 2022-03-15 PROCEDURE — 83036 HEMOGLOBIN GLYCOSYLATED A1C: CPT | Performed by: NURSE PRACTITIONER

## 2022-03-15 PROCEDURE — G0463 HOSPITAL OUTPT CLINIC VISIT: HCPCS | Performed by: NURSE PRACTITIONER

## 2022-03-15 PROCEDURE — 99213 OFFICE O/P EST LOW 20 MIN: CPT | Performed by: NURSE PRACTITIONER

## 2022-03-15 PROCEDURE — 95251 CONT GLUC MNTR ANALYSIS I&R: CPT | Performed by: NURSE PRACTITIONER

## 2022-03-15 PROCEDURE — 82962 GLUCOSE BLOOD TEST: CPT | Performed by: NURSE PRACTITIONER

## 2022-03-15 NOTE — PROGRESS NOTES
Chief Complaint  Diabetes (Having low blood sugars, )    Referred By: Self Referring    Subjective          Moy Granados presents to [facility] for insulin pump management    History of Present Illness    Visit type:  follow-up  Diabetes type:  Type 1  Current diabetes status/concerns/issues: He is having occasional episodes of hypoglycemia but otherwise denies any complaints regarding his diabetes today.  He did run out of his supplies and is waiting for shipment which should arrive by the end of the week.  In the interim he is taking injections of insulin  Other health concerns: No other health concerns  Diabetes symptoms:    Polyuria: No   Polydipsia: No   Polyphagia: No   Blurred vision: No   Excessive fatigue: No  Diabetes complications:  Neuropathy:No  Nephropathy:No  Retinopathy:Yes, He had a small bleed in his right eye that has resolved  Amputation/Wounds:No  Gastroparesis:No  Cardiovascular Disease:No  Erectile Dysfunction:No  Hypoglycemia:  Level 1 hypoglycemia (54 mg/dL - 70 mg/dL); Frequency - 1% of glucose levels are in this range and Level 2 (less than 54 mg/dL); Frequency - 1% of glucose levels are in this range  Hypoglycemia Symptoms:  shaking/tremors and headache  Current diabetes treatment:  Lantus 50 units once a day and Humalog using the previous carbohydrate ratio and correction dosing while he is off the pump; tandem insulin pump using Humalog insulin  Blood glucose device:  Dexcom CGM  Blood glucose monitoring frequency:  Continuous per CGM  Blood glucose range/average: The 14-day continuous glucose sensor report indicates an average glucose of 172 mg/dL with 60% falling in target between 70 and 180 mg/dL while 39% are above target and 1% below target.  He does have a hyperglycemic excursion occurring at about 1 AM.  This is when he eats at work.  Glucose levels are elevated during the daytime hours but he has wide fluctuation with a standard deviation of  68 mg/dL during the same time of day  Diet:  Carbohydrate Counting, Avoids high carb/sweet foods, Diet drinks only  Activity/Exercise:  He is physically active at work    Past Medical History:   Diagnosis Date   • Type 1 diabetes (HCC)      Past Surgical History:   Procedure Laterality Date   • EAR TUBES       Family History   Problem Relation Age of Onset   • Diabetes type I Other    • Diabetes type II Other      Social History     Socioeconomic History   • Marital status:    • Number of children: 2   Tobacco Use   • Smoking status: Former Smoker     Packs/day: 0.50     Types: Cigarettes     Quit date: 2019     Years since quitting: 3.2   • Smokeless tobacco: Current User     Types: Snuff   Vaping Use   • Vaping Use: Every day   • Substances: Flavoring   • Devices: Refillable tank   Substance and Sexual Activity   • Alcohol use: Not Currently   • Drug use: Never   • Sexual activity: Yes     Partners: Female     Comment: /wife      No Known Allergies    Current Outpatient Medications:   •  acetaminophen (TYLENOL) 500 MG tablet, Take 500 mg by mouth Every 6 (Six) Hours As Needed for Mild Pain  (prn for head aches and pther minor pain)., Disp: , Rfl:   •  Continuous Blood Gluc Sensor (Dexcom G6 Sensor), Every 10 (Ten) Days., Disp: 3 each, Rfl: 11  •  Continuous Blood Gluc Transmit (Dexcom G6 Transmitter) misc, 1 each Every 3 (Three) Months., Disp: 1 each, Rfl: 3  •  Glucagon (Gvoke HypoPen 2-Pack) 0.5 MG/0.1ML solution auto-injector, Inject 0.5 mg under the skin into the appropriate area as directed As Needed (use for severe hypoglycemia)., Disp: 0.1 mL, Rfl: 3  •  Insulin Glargine (LANTUS SOLOSTAR) 100 UNIT/ML injection pen, Inject 50 Units under the skin into the appropriate area as directed Every Night., Disp: 5 pen, Rfl: 2  •  insulin lispro (humaLOG) 100 UNIT/ML injection, Use up to 80 units per day per insulin pump, Disp: 30 mL, Rfl: 5    Review of Systems     Objective     Vitals:    03/15/22 0824  "  BP: 136/86   BP Location: Right arm   Patient Position: Sitting   Cuff Size: Adult   Pulse: 97   Temp: 97.4 °F (36.3 °C)   SpO2: 95%   Weight: 84.4 kg (186 lb 1.1 oz)   Height: 182.9 cm (72\")   PainSc: 0-No pain     Body mass index is 25.24 kg/m².    Physical Exam    Result Review :   The following data was reviewed by: KENJI Girard on 03/15/2022:    Point-of-care A1c collected in the office today was 7.6% indicating uncontrolled type 1 diabetes.  This is up from the previous result of 6.6 collected in December 2021.    Most Recent A1C    HGBA1C Most Recent 3/15/22   Hemoglobin A1C 7.6             A1C Last 3 Results    HGBA1C Last 3 Results 7/1/21 12/15/21 3/15/22   Hemoglobin A1C 9.2 6.6 7.6             Glucose   Date Value Ref Range Status   03/15/2022 50 (L) 70 - 99 mg/dL Final     Comment:     Serial Number: [id number]:  [id number]     The patient is hypoglycemic during the visit today.  He was provided both rapid and long acting carbohydrates for treatment.          Assessment: He has had an increase in his A1c which may be in part due to him being off of his pump recently due to insufficient supplies.  He does have some widely fluctuating glucose levels occurring during the daytime hours and some postprandial hyperglycemia.  He states that sometimes he has hypoglycemia because he is not counting his carbohydrates correctly or he is busier than usual at work.      Diagnoses and all orders for this visit:    1. Uncontrolled type 1 diabetes mellitus with hyperglycemia (HCC) (Primary)  -     POC Glycosylated Hemoglobin (Hb A1C)    Other orders  -     POC Glucose        Plan: No changes were made to his pump settings.  He was given some supplies to hold him over until his personal supplies come in for the pump.  He is encouraged to monitor his carbohydrate count more closely to prevent both hyperglycemia and hypoglycemia.  He will be scheduled for routine follow-up appointment.    The patient " will monitor his blood glucose levels using his continuous glucose sensor.  If he develops problematic hyperglycemia or hypoglycemia or adverse drug reaction, he will contact the office for further instructions.        Follow Up     Return in about 3 months (around 6/15/2022) for Pump Eval, CGM Follow-up.    Patient was given instructions and counseling regarding his condition or for health maintenance advice. Please see specific information pulled into the AVS if appropriate.     Kym Alfaro, KENJI  03/15/2022

## 2022-03-17 ENCOUNTER — APPOINTMENT (OUTPATIENT)
Dept: DIABETES SERVICES | Facility: HOSPITAL | Age: 32
End: 2022-03-17

## 2022-06-21 ENCOUNTER — OFFICE VISIT (OUTPATIENT)
Dept: DIABETES SERVICES | Facility: HOSPITAL | Age: 32
End: 2022-06-21

## 2022-06-21 VITALS
DIASTOLIC BLOOD PRESSURE: 70 MMHG | SYSTOLIC BLOOD PRESSURE: 120 MMHG | HEART RATE: 107 BPM | HEIGHT: 72 IN | BODY MASS INDEX: 26.46 KG/M2 | WEIGHT: 195.33 LBS | OXYGEN SATURATION: 99 %

## 2022-06-21 DIAGNOSIS — E10.3291: Primary | ICD-10-CM

## 2022-06-21 LAB
EXPIRATION DATE: ABNORMAL
HBA1C MFR BLD: 6.7 %
Lab: ABNORMAL

## 2022-06-21 PROCEDURE — G0463 HOSPITAL OUTPT CLINIC VISIT: HCPCS | Performed by: NURSE PRACTITIONER

## 2022-06-21 PROCEDURE — 83036 HEMOGLOBIN GLYCOSYLATED A1C: CPT | Performed by: NURSE PRACTITIONER

## 2022-06-21 PROCEDURE — 95251 CONT GLUC MNTR ANALYSIS I&R: CPT | Performed by: NURSE PRACTITIONER

## 2022-06-21 PROCEDURE — 99213 OFFICE O/P EST LOW 20 MIN: CPT | Performed by: NURSE PRACTITIONER

## 2022-06-21 RX ORDER — INSULIN LISPRO 100 [IU]/ML
INJECTION, SOLUTION INTRAVENOUS; SUBCUTANEOUS
COMMUNITY
Start: 2022-06-14 | End: 2022-09-26 | Stop reason: SDUPTHER

## 2022-06-21 NOTE — PROGRESS NOTES
Chief Complaint  Diabetes    Referred By: Self Referring    Subjective          Moy Granados presents to [facility] for insulin pump management    History of Present Illness    Visit type:  follow-up  Diabetes type:  Type 1  Current diabetes status/concerns/issues: He denies any specific concerns regarding his diabetes today.  Other health concerns: No new health concerns  Diabetes symptoms:    Polyuria: No   Polydipsia: No   Polyphagia: No   Blurred vision: No   Excessive fatigue: No  Diabetes complications:  Neuro: None  Renal: None  Eyes: Mild nonproliferative retinopathy of the right eye  Amputation/Wounds: None  GI: None  Cardiovascular: None  ED: None  Other: None  Hypoglycemia:  Level 1 hypoglycemia (54 mg/dL - 70 mg/dL); Frequency - The insulin pump report indicates 1% of glucose levels are below target and Level 2 (less than 54 mg/dL); Frequency - There is a recorded glucose level as low as 40 however the patient states he was having some sensor issues and he is not certain this was a legitimate episode of hypoglycemia  Hypoglycemia Symptoms:  shaking/tremors  Current diabetes treatment:  tandem insulin pump using Humalog insulin.  He is using approximately 65 units of insulin each day  Blood glucose device:  Dexcom CGM  Blood glucose monitoring frequency:  Continuous per CGM  Blood glucose range/average: The continuous glucose sensor indicates an average glucose of 164 mg/dL with a high of 400 and low of 40.  68% of glucose levels are in target range between 70 and 180 while 31% are above target and 1% are below target  Diet:  Carbohydrate Counting, Avoids high carb/sweet foods, Avoids sugary drinks  Activity/Exercise:  Physically active at work    Past Medical History:   Diagnosis Date   • Type 1 diabetes (HCC)      Past Surgical History:   Procedure Laterality Date   • EAR TUBES       Family History   Problem Relation Age of Onset   • Diabetes type I Other    •  Diabetes type II Other      Social History     Socioeconomic History   • Marital status:    • Number of children: 2   Tobacco Use   • Smoking status: Former Smoker     Packs/day: 0.50     Types: Cigarettes     Quit date: 2019     Years since quitting: 3.4   • Smokeless tobacco: Current User     Types: Snuff   Vaping Use   • Vaping Use: Every day   • Substances: Flavoring   • Devices: Refillable tank   Substance and Sexual Activity   • Alcohol use: Not Currently   • Drug use: Never   • Sexual activity: Yes     Partners: Female     Comment: /wife      No Known Allergies    Current Outpatient Medications:   •  acetaminophen (TYLENOL) 500 MG tablet, Take 500 mg by mouth Every 6 (Six) Hours As Needed for Mild Pain  (prn for head aches and pther minor pain)., Disp: , Rfl:   •  Continuous Blood Gluc Sensor (Dexcom G6 Sensor), Every 10 (Ten) Days., Disp: 3 each, Rfl: 11  •  Continuous Blood Gluc Transmit (Dexcom G6 Transmitter) misc, 1 each Every 3 (Three) Months., Disp: 1 each, Rfl: 3  •  Glucagon (Gvoke HypoPen 2-Pack) 0.5 MG/0.1ML solution auto-injector, Inject 0.5 mg under the skin into the appropriate area as directed As Needed (use for severe hypoglycemia)., Disp: 0.1 mL, Rfl: 3  •  HumaLOG 100 UNIT/ML injection, USE UP TO 80 UNITS PER DAY PER INSULIN PUMP, Disp: , Rfl:   •  Insulin Glargine (LANTUS SOLOSTAR) 100 UNIT/ML injection pen, Inject 50 Units under the skin into the appropriate area as directed Every Night., Disp: 5 pen, Rfl: 2  •  insulin lispro (HumaLOG) 100 UNIT/ML injection, USE UP TO 80 UNITS PER DAY PER INSULIN PUMP, Disp: 30 mL, Rfl: 5    Review of Systems   Constitutional: Negative for activity change ( ), appetite change, fatigue, fever, unexpected weight gain and unexpected weight loss.   HENT: Negative for congestion, ear pain, facial swelling, hearing loss, sore throat and tinnitus.    Eyes: Negative for blurred vision, double vision, redness and visual disturbance.   Respiratory:  "Negative for cough, shortness of breath and wheezing.    Cardiovascular: Negative for chest pain, palpitations and leg swelling.   Gastrointestinal: Negative for abdominal distention, constipation, diarrhea, nausea, vomiting, GERD and indigestion.   Endocrine: Negative for polydipsia, polyphagia and polyuria.   Genitourinary: Negative for difficulty urinating, frequency and urgency.   Musculoskeletal: Negative for back pain, gait problem and myalgias.   Skin: Negative for rash, skin lesions and wound.   Neurological: Negative for seizures, speech difficulty, weakness, headache and confusion.   Psychiatric/Behavioral: Negative for sleep disturbance, depressed mood and stress. The patient is not nervous/anxious.         Objective     Vitals:    06/21/22 0805   BP: 120/70   Pulse: 107   SpO2: 99%   Weight: 88.6 kg (195 lb 5.2 oz)   Height: 182.9 cm (72.01\")     Body mass index is 26.49 kg/m².    Physical Exam    Result Review :   The following data was reviewed by: KENJI Girard on 06/21/2022:    Point-of-care A1c collected in the office today was 6.7% indicating controlled type 1 diabetes.  This is down from the prior result of 7.6 collected and March of this year    Most Recent A1C    HGBA1C Most Recent 6/21/22   Hemoglobin A1C 6.7             A1C Last 3 Results    HGBA1C Last 3 Results 12/15/21 3/15/22 6/21/22   Hemoglobin A1C 6.6 7.6 6.7                   Assessment: His A1c is now back in a controlled status.  He denies having problematic hypoglycemia.  He is having postprandial hyperglycemia which appears to be the result of failure to enter carbohydrates with meals routinely      Diagnoses and all orders for this visit:    1. 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  The patient was encouraged to enter " carbohydrates with each meal to prevent the postprandial hypoglycemia.  He will be scheduled for routine follow-up appointment    The patient will monitor his blood glucose levels using the Dexcom continuous glucose sensor.  If he develops problematic hyperglycemia or hypoglycemia or adverse drug reactions, he will contact the office for further instructions.        Follow Up     Return in about 3 months (around 9/21/2022) for Pump Eval, CGM Follow-up.    Patient was given instructions and counseling regarding his condition or for health maintenance advice. Please see specific information pulled into the AVS if appropriate.     Kym Alfaro, APRN  06/21/2022

## 2022-08-02 RX ORDER — PROCHLORPERAZINE 25 MG/1
SUPPOSITORY RECTAL
Qty: 3 EACH | Refills: 11 | Status: SHIPPED | OUTPATIENT
Start: 2022-08-02 | End: 2023-04-04 | Stop reason: SDUPTHER

## 2022-09-05 RX ORDER — INSULIN LISPRO 100 [IU]/ML
INJECTION, SOLUTION INTRAVENOUS; SUBCUTANEOUS
Qty: 30 ML | Refills: 5 | Status: SHIPPED | OUTPATIENT
Start: 2022-09-05 | End: 2023-01-03 | Stop reason: SDUPTHER

## 2022-09-14 RX ORDER — PROCHLORPERAZINE 25 MG/1
SUPPOSITORY RECTAL
Qty: 1 EACH | Refills: 3 | Status: SHIPPED | OUTPATIENT
Start: 2022-09-14 | End: 2023-04-04 | Stop reason: SDUPTHER

## 2022-09-26 ENCOUNTER — OFFICE VISIT (OUTPATIENT)
Dept: DIABETES SERVICES | Facility: HOSPITAL | Age: 32
End: 2022-09-26

## 2022-09-26 VITALS
OXYGEN SATURATION: 98 % | WEIGHT: 196.43 LBS | BODY MASS INDEX: 26.61 KG/M2 | HEIGHT: 72 IN | HEART RATE: 78 BPM | SYSTOLIC BLOOD PRESSURE: 128 MMHG | TEMPERATURE: 94.1 F | DIASTOLIC BLOOD PRESSURE: 74 MMHG

## 2022-09-26 DIAGNOSIS — E10.3291: Primary | ICD-10-CM

## 2022-09-26 DIAGNOSIS — N64.4 BREAST TENDERNESS IN MALE: ICD-10-CM

## 2022-09-26 DIAGNOSIS — Z97.8 USES SELF-APPLIED CONTINUOUS GLUCOSE MONITORING DEVICE: ICD-10-CM

## 2022-09-26 DIAGNOSIS — Z96.41 INSULIN PUMP IN PLACE: ICD-10-CM

## 2022-09-26 LAB
EXPIRATION DATE: ABNORMAL
HBA1C MFR BLD: 6.5 %
Lab: ABNORMAL

## 2022-09-26 PROCEDURE — 99213 OFFICE O/P EST LOW 20 MIN: CPT | Performed by: NURSE PRACTITIONER

## 2022-09-26 PROCEDURE — 95251 CONT GLUC MNTR ANALYSIS I&R: CPT | Performed by: NURSE PRACTITIONER

## 2022-09-26 PROCEDURE — G0463 HOSPITAL OUTPT CLINIC VISIT: HCPCS | Performed by: NURSE PRACTITIONER

## 2022-09-26 PROCEDURE — 83036 HEMOGLOBIN GLYCOSYLATED A1C: CPT | Performed by: NURSE PRACTITIONER

## 2022-09-26 NOTE — PROGRESS NOTES
Chief Complaint  Diabetes (No concerns at this time )    Referred By: Self Referring    Subjective          Moy Granados presents to [facility] for insulin pump management    History of Present Illness    Visit type:  follow-up  Diabetes type:  Type 1  Current diabetes status/concerns/issues: He denies any concerns regarding his diabetes specifically today.  He has been on and off of his pump at times but he will take injections of insulin while he is off the pump.  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  He is using approximately 40 units of insulin each day  Blood glucose device:  Dexcom CGM  Blood glucose monitoring frequency:  Continuous per CGM  Blood glucose range/average: The pump report shows a sensor glucose average of 183 milligrams per deciliter with 62% of glucose levels found in target range between 70 and 180 mg/dL while 37% are above target and 1% are below target.  This is based on only 5 days worth of sensor data.  Appears the patient has been on and off of his pump over this last 2 weeks.  He states a markedly elevated glucose level greater than 400 occurred after he had done a ghost pepper chip challenge and drank large quantities of milk which raised his blood sugar.  Diet:  Carbohydrate Counting, Limits high carb/sweet foods, Avoids sugary drinks  Activity/Exercise:  active with  work    Past Medical History:   Diagnosis Date   • Type 1 diabetes (HCC)      Past Surgical History:   Procedure Laterality Date   • EAR TUBES       Family History   Problem Relation Age of Onset   • Diabetes type I Other    • Diabetes type II Other      Social History     Socioeconomic History   • Marital status:    • Number of children: 2   Tobacco Use   • Smoking status: Former Smoker     Packs/day: 0.50     Types: Cigarettes     Quit date: 2019     Years since quitting: 3.7   • Smokeless tobacco: Current User     Types: Snuff   Vaping Use   • Vaping Use: Every day   • Substances: Flavoring   • Devices: Refillable tank   Substance and Sexual Activity   • Alcohol use: Not Currently   • Drug use: Never   • Sexual activity: Yes     Partners: Female     Comment: /wife      No Known Allergies    Current Outpatient Medications:   •  acetaminophen (TYLENOL) 500 MG tablet, Take 500 mg by mouth Every 6 (Six) Hours As Needed for Mild Pain  (prn for head aches and pther minor pain)., Disp: , Rfl:   •  Continuous Blood Gluc Sensor (Dexcom G6 Sensor), CHANGE SENSOR EVERY 10 DAYS, Disp: 3 each, Rfl: 11  •  Continuous Blood Gluc Transmit (Dexcom G6 Transmitter) misc, USE AS DIRECTED FOR 3 MONTHS, Disp: 1 each, Rfl: 3  •  Glucagon (Gvoke HypoPen 2-Pack) 0.5 MG/0.1ML solution auto-injector, Inject 0.5 mg under the skin into the appropriate area as directed As Needed (use for severe hypoglycemia)., Disp: 0.1 mL, Rfl: 3  •  HumaLOG 100 UNIT/ML injection, USE UP TO 80 UNITS PER DAY PER INSULIN PUMP, Disp: 30 mL, Rfl: 5  •  Insulin Glargine (LANTUS SOLOSTAR) 100 UNIT/ML injection pen, Inject 50 Units under the skin into the appropriate area as directed Every Night., Disp: 5 pen, Rfl: 2    Review of Systems   Constitutional: Negative for activity change, appetite change, fatigue, fever, unexpected weight gain and unexpected weight loss.   HENT: Negative for congestion, ear pain, facial swelling, hearing loss, sore throat  "and tinnitus.    Eyes: Negative for blurred vision, double vision, redness and visual disturbance.   Respiratory: Negative for cough, shortness of breath and wheezing.    Cardiovascular: Negative for chest pain, palpitations and leg swelling.   Gastrointestinal: Negative for abdominal distention, constipation, diarrhea, nausea, vomiting, GERD and indigestion.   Endocrine: Negative for polydipsia, polyphagia and polyuria.   Genitourinary: Negative for difficulty urinating, frequency and urgency.   Musculoskeletal: Positive for myalgias. Negative for back pain and gait problem.   Skin: Negative for rash, skin lesions and wound.   Neurological: Negative for seizures, speech difficulty, weakness, headache and confusion.   Psychiatric/Behavioral: Negative for sleep disturbance, depressed mood and stress. The patient is not nervous/anxious.         Objective     Vitals:    09/26/22 0826   BP: 128/74   BP Location: Right arm   Patient Position: Sitting   Cuff Size: Adult   Pulse: 78   Temp: 94.1 °F (34.5 °C)   SpO2: 98%   Weight: 89.1 kg (196 lb 6.9 oz)   Height: 182.9 cm (72\")   PainSc: 0-No pain     Body mass index is 26.64 kg/m².    Physical Exam  Constitutional:       Appearance: Normal appearance.      Comments: Overweight with BMI of 26.64   HENT:      Head: Normocephalic and atraumatic.      Right Ear: External ear normal.      Left Ear: External ear normal.      Nose: Nose normal.   Eyes:      Extraocular Movements: Extraocular movements intact.      Conjunctiva/sclera: Conjunctivae normal.   Pulmonary:      Effort: Pulmonary effort is normal.   Chest:      Chest wall: Tenderness present.   Breasts:      Left: Mass and tenderness present.            Comments: There is no redness or drainage noted from the nipple  Musculoskeletal:         General: Normal range of motion.      Cervical back: Normal range of motion.   Skin:     General: Skin is warm and dry.   Neurological:      General: No focal deficit present.      " Mental Status: He is alert and oriented to person, place, and time. Mental status is at baseline.   Psychiatric:         Mood and Affect: Mood normal.         Behavior: Behavior normal.         Thought Content: Thought content normal.         Judgment: Judgment normal.         Result Review :   The following data was reviewed by: KENJI Girard on 09/26/2022:    Most Recent A1C    HGBA1C Most Recent 9/26/22   Hemoglobin A1C 6.5             A1C Last 3 Results    HGBA1C Last 3 Results 3/15/22 6/21/22 9/26/22   Hemoglobin A1C 7.6 6.7 6.5           Plan of care A1c in the office today was 6.5% indicating controlled type 1 diabetes.  This is down from the prior result of 6.7 collected in June of this year.            Assessment: Patient's A1c remains well controlled.  He has been using his pump on and off at times.  He states he just gets tired of wearing it for few days and he will take Lantus and Humalog by injection when he is off the pump.  He does have some postprandial hyperglycemia and has times when he forgets to put his carbohydrates in the pump.  He is complaining of some tenderness and a small pea-sized lump adjacent to the left nipple.  He states this tenderness has been present for approximately 1 week.      Diagnoses and all orders for this visit:    1. 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 Breast tenderness in male    3. Uses self-applied continuous glucose monitoring device    4. Insulin pump in place        Plan: No changes were made to his pump settings.  He is encouraged to enter carbohydrates into his pump with every meal and remain on his pump is much as possible.    In regard to the tenderness around the left nipple, the patient will contact our office in 1 week to advise if the area has changed in appearance, tenderness, or increase in size of  the small lump.  If it has not resolved we will follow-up with further evaluation as the patient does not have a primary care provider at this time.    The patient will monitor his blood glucose levels using the continuous glucose sensor.  If he develops problematic hyperglycemia or hypoglycemia or adverse drug reactions, he will contact the office for further instructions.        Follow Up     Return in about 3 months (around 12/26/2022) for Pump Eval, CGM Follow-up.    Patient was given instructions and counseling regarding his condition or for health maintenance advice. Please see specific information pulled into the AVS if appropriate.     Kym Alfaro, APRN  09/26/2022      Dictated Utilizing Dragon Dictation.  Please note that portions of this note were completed with a voice recognition program.  Part of this note may be an electronic transcription/translation of spoken language to printed text using the Dragon Dictation System.

## 2023-01-03 ENCOUNTER — OFFICE VISIT (OUTPATIENT)
Dept: DIABETES SERVICES | Facility: HOSPITAL | Age: 33
End: 2023-01-03
Payer: COMMERCIAL

## 2023-01-03 VITALS
DIASTOLIC BLOOD PRESSURE: 70 MMHG | HEART RATE: 86 BPM | WEIGHT: 201 LBS | SYSTOLIC BLOOD PRESSURE: 122 MMHG | HEIGHT: 72 IN | BODY MASS INDEX: 27.22 KG/M2 | OXYGEN SATURATION: 98 %

## 2023-01-03 DIAGNOSIS — E10.65 UNCONTROLLED TYPE 1 DIABETES MELLITUS WITH HYPERGLYCEMIA: Primary | ICD-10-CM

## 2023-01-03 DIAGNOSIS — E10.3291 TYPE 1 DIABETES MELLITUS WITH MILD NONPROLIFERATIVE RETINOPATHY OF RIGHT EYE WITHOUT MACULAR EDEMA: ICD-10-CM

## 2023-01-03 DIAGNOSIS — E66.3 OVERWEIGHT (BMI 25.0-29.9): ICD-10-CM

## 2023-01-03 LAB
EXPIRATION DATE: ABNORMAL
GLUCOSE BLDC GLUCOMTR-MCNC: 257 MG/DL (ref 70–99)
HBA1C MFR BLD: 7.5 %
Lab: ABNORMAL

## 2023-01-03 PROCEDURE — 99213 OFFICE O/P EST LOW 20 MIN: CPT | Performed by: NURSE PRACTITIONER

## 2023-01-03 PROCEDURE — G0463 HOSPITAL OUTPT CLINIC VISIT: HCPCS | Performed by: NURSE PRACTITIONER

## 2023-01-03 PROCEDURE — 82962 GLUCOSE BLOOD TEST: CPT | Performed by: NURSE PRACTITIONER

## 2023-01-03 RX ORDER — INSULIN LISPRO 100 [IU]/ML
80 INJECTION, SOLUTION INTRAVENOUS; SUBCUTANEOUS DAILY
Qty: 30 ML | Refills: 5 | Status: SHIPPED | OUTPATIENT
Start: 2023-01-03

## 2023-01-03 NOTE — PROGRESS NOTES
Chief Complaint  Diabetes (Follow up, med mgt, a1c eval, pump/cgm eval )    Referred By: Self Referring    Subjective          Moy Granados presents to [facility] for diabetes medication management    History of Present Illness    Visit type:  follow-up  Diabetes type:  Type 1  Current diabetes status/concerns/issues:  He has been off his pump since early November and taking injections instead  Other health concerns: the lumps in the breast area went away on their own after a week or two  Diabetes symptoms:    Polyuria: No   Polydipsia: No   Polyphagia: No   Blurred vision: No   Excessive fatigue: No   Diabetes complications:  Neuro: None  Renal: None  Eyes: 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  He is currently taking Lantus 50 units and 1:10 carb ratio and correction of 1:25 for BGs above 150   Blood glucose device:  Dexcom CGM; meter  Blood glucose monitoring frequency:  Continuous per CGM; he is currently off of his sensor  Blood glucose range/average:  150-200s  Diet:  Carbohydrate Counting, Limits high carb/sweet foods, Avoids sugary drinks  Activity/Exercise:  active with work    Past Medical History:   Diagnosis Date   • Type 1 diabetes (HCC)      Past Surgical History:   Procedure Laterality Date   • EAR TUBES       Family History   Problem Relation Age of Onset   • Diabetes type I Other    • Diabetes type II Other      Social History     Socioeconomic History   • Marital status:    • Number of children: 2   Tobacco Use   • Smoking status: Former     Packs/day: 0.50     Types: Cigarettes     Quit date: 2019     Years since quittin.0   • Smokeless tobacco:  Current     Types: Snuff   Vaping Use   • Vaping Use: Every day   • Substances: Flavoring   • Devices: Refillable tank   Substance and Sexual Activity   • Alcohol use: Not Currently   • Drug use: Never   • Sexual activity: Yes     Partners: Female     Comment: /wife      No Known Allergies    Current Outpatient Medications:   •  acetaminophen (TYLENOL) 500 MG tablet, Take 500 mg by mouth Every 6 (Six) Hours As Needed for Mild Pain  (prn for head aches and pther minor pain)., Disp: , Rfl:   •  Continuous Blood Gluc Sensor (Dexcom G6 Sensor), CHANGE SENSOR EVERY 10 DAYS, Disp: 3 each, Rfl: 11  •  Continuous Blood Gluc Transmit (Dexcom G6 Transmitter) misc, USE AS DIRECTED FOR 3 MONTHS, Disp: 1 each, Rfl: 3  •  Glucagon (Gvoke HypoPen 2-Pack) 0.5 MG/0.1ML solution auto-injector, Inject 0.5 mg under the skin into the appropriate area as directed As Needed (use for severe hypoglycemia)., Disp: 0.1 mL, Rfl: 3  •  Insulin Glargine (LANTUS SOLOSTAR) 100 UNIT/ML injection pen, Inject 50 Units under the skin into the appropriate area as directed Every Night., Disp: 15 mL, Rfl: 2  •  Insulin Lispro (HumaLOG) 100 UNIT/ML injection, Inject 80 Units under the skin into the appropriate area as directed Daily. Per insulin pump, Disp: 30 mL, Rfl: 5    Review of Systems   Constitutional: Positive for unexpected weight gain (5 pounds). Negative for activity change, appetite change, fatigue and unexpected weight loss.   Eyes: Negative for blurred vision and visual disturbance.   Gastrointestinal: Negative for abdominal pain, constipation, diarrhea, nausea, vomiting, GERD and indigestion.   Endocrine: Negative for polydipsia, polyphagia and polyuria.   Neurological: Negative for numbness.        Objective     Vitals:    01/03/23 0825   BP: 122/70   BP Location: Right arm   Patient Position: Sitting   Cuff Size: Adult   Pulse: 86   SpO2: 98%   Weight: 91.2 kg (201 lb)   Height: 182.9 cm (72\")   PainSc: 0-No pain     Body mass  index is 27.26 kg/m².    Physical Exam  Constitutional:       Appearance: Normal appearance. He is normal weight.      Comments: Overweight with BMI of 27.26   HENT:      Head: Normocephalic and atraumatic.      Right Ear: External ear normal.      Left Ear: External ear normal.      Nose: Nose normal.   Eyes:      Extraocular Movements: Extraocular movements intact.      Conjunctiva/sclera: Conjunctivae normal.   Pulmonary:      Effort: Pulmonary effort is normal.   Musculoskeletal:         General: Normal range of motion.      Cervical back: Normal range of motion.   Skin:     General: Skin is warm and dry.   Neurological:      General: No focal deficit present.      Mental Status: He is alert and oriented to person, place, and time. Mental status is at baseline.   Psychiatric:         Mood and Affect: Mood normal.         Behavior: Behavior normal.         Thought Content: Thought content normal.         Judgment: Judgment normal.         Result Review :   The following data was reviewed by: KENJI Girard on 01/03/2023:    Most Recent A1C    HGBA1C Most Recent 1/3/23   Hemoglobin A1C 7.5             A1C Last 3 Results    HGBA1C Last 3 Results 6/21/22 9/26/22 1/3/23   Hemoglobin A1C 6.7 6.5 7.5           Point of care A1c in the office today is 7.5% indicating uncontrolled type 1 diabetes.  This is up from the prior result of 6.5% collected in September          Assessment: He has had an increase in his A1c and is no longer in a controlled status.  Patient has been off of his insulin pump and continuous glucose sensor since early November.  He says his healthcare spending account ran out of money and he could not afford to purchase the supplies.  He is planning to reorder supplies today.      Diagnoses and all orders for this visit:    1. Uncontrolled type 1 diabetes mellitus with hyperglycemia (HCC) (Primary)  -     POC Glucose  -     POC Glycosylated Hemoglobin (Hb A1C)  -     Insulin Lispro  (HumaLOG) 100 UNIT/ML injection; Inject 80 Units under the skin into the appropriate area as directed Daily. Per insulin pump  Dispense: 30 mL; Refill: 5  -     Insulin Glargine (LANTUS SOLOSTAR) 100 UNIT/ML injection pen; Inject 50 Units under the skin into the appropriate area as directed Every Night.  Dispense: 15 mL; Refill: 2    2. 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 Overweight (BMI 25.0-29.9)        Plan: He is strongly encouraged to get back on his insulin pump and continuous glucose sensor soon as possible.  He will be scheduled for routine follow-up appointment in 3 months.  He is to contact our office if he has any difficulty once he is back on the pump.    The patient will monitor his blood glucose levels using his continuous glucose sensor.  If he develops problematic hyperglycemia or hypoglycemia or adverse drug reactions, he will contact the office for further instructions.        Follow Up     Return in about 3 months (around 4/3/2023) for Pump Eval, CGM Follow-up.    Patient was given instructions and counseling regarding his condition or for health maintenance advice. Please see specific information pulled into the AVS if appropriate.     Kym Alfaro, APRN  01/03/2023      Dictated Utilizing Dragon Dictation.  Please note that portions of this note were completed with a voice recognition program.  Part of this note may be an electronic transcription/translation of spoken language to printed text using the Dragon Dictation System.

## 2023-04-04 ENCOUNTER — OFFICE VISIT (OUTPATIENT)
Dept: DIABETES SERVICES | Facility: HOSPITAL | Age: 33
End: 2023-04-04
Payer: COMMERCIAL

## 2023-04-04 VITALS
DIASTOLIC BLOOD PRESSURE: 78 MMHG | HEART RATE: 102 BPM | BODY MASS INDEX: 28.31 KG/M2 | WEIGHT: 209 LBS | TEMPERATURE: 98.1 F | OXYGEN SATURATION: 97 % | SYSTOLIC BLOOD PRESSURE: 142 MMHG | HEIGHT: 72 IN

## 2023-04-04 DIAGNOSIS — E66.3 OVERWEIGHT (BMI 25.0-29.9): ICD-10-CM

## 2023-04-04 DIAGNOSIS — E10.3291 TYPE 1 DIABETES MELLITUS WITH MILD NONPROLIFERATIVE RETINOPATHY OF RIGHT EYE WITHOUT MACULAR EDEMA: ICD-10-CM

## 2023-04-04 DIAGNOSIS — E10.65 UNCONTROLLED TYPE 1 DIABETES MELLITUS WITH HYPERGLYCEMIA: Primary | ICD-10-CM

## 2023-04-04 LAB — GLUCOSE BLDC GLUCOMTR-MCNC: 246 MG/DL (ref 70–99)

## 2023-04-04 PROCEDURE — 82962 GLUCOSE BLOOD TEST: CPT | Performed by: NURSE PRACTITIONER

## 2023-04-04 PROCEDURE — G0463 HOSPITAL OUTPT CLINIC VISIT: HCPCS | Performed by: NURSE PRACTITIONER

## 2023-04-04 PROCEDURE — 95251 CONT GLUC MNTR ANALYSIS I&R: CPT | Performed by: NURSE PRACTITIONER

## 2023-04-04 PROCEDURE — 99214 OFFICE O/P EST MOD 30 MIN: CPT | Performed by: NURSE PRACTITIONER

## 2023-04-04 RX ORDER — INSULIN PMP CART,AUT,G6/7,CNTR
1 EACH SUBCUTANEOUS
Qty: 10 EACH | Refills: 5 | Status: SHIPPED | OUTPATIENT
Start: 2023-04-04

## 2023-04-04 RX ORDER — PROCHLORPERAZINE 25 MG/1
SUPPOSITORY RECTAL
Qty: 3 EACH | Refills: 11 | Status: SHIPPED | OUTPATIENT
Start: 2023-04-04

## 2023-04-04 RX ORDER — PROCHLORPERAZINE 25 MG/1
1 SUPPOSITORY RECTAL
Qty: 1 EACH | Refills: 3 | Status: SHIPPED | OUTPATIENT
Start: 2023-04-04

## 2023-04-04 RX ORDER — INSULIN PMP CART,AUT,G6/7,CNTR
1 EACH SUBCUTANEOUS ONCE
Qty: 1 KIT | Refills: 0 | Status: SHIPPED | OUTPATIENT
Start: 2023-04-04 | End: 2023-04-04

## 2023-04-17 ENCOUNTER — TELEPHONE (OUTPATIENT)
Dept: DIABETES SERVICES | Facility: HOSPITAL | Age: 33
End: 2023-04-17
Payer: COMMERCIAL

## 2023-04-17 NOTE — TELEPHONE ENCOUNTER
Cell phone was off- pt does work 3rd shift. I did leave a detailed voice mail-  I read him the web sit address [email or web address], I also read the instructions that is on the page I was gave. Pt has apt in the morning with the diabetes educator

## 2023-04-18 ENCOUNTER — EDUCATION (OUTPATIENT)
Dept: DIABETES SERVICES | Facility: HOSPITAL | Age: 33
End: 2023-04-18
Payer: COMMERCIAL

## 2023-04-18 DIAGNOSIS — E10.9 DIABETES MELLITUS TYPE 1, CONTROLLED, WITHOUT COMPLICATIONS: Primary | ICD-10-CM

## 2023-04-18 NOTE — PROGRESS NOTES
Moy Hawkins Roberta 32 y.o. with wife and child presents for insulin pump instructions.  OmniPod insulin pump training check list completed.  Patient stated they have viewed some of the insulin pump videos.  Patient was able to maneuver pump screens without difficulty.  Patient applied insulin pump insertion site without difficulty 15-15 rule for treatment of low blood glucose was reviewed with patient. Patient was explained to always have back up supplies including blood glucose meter, strips, lancets, basal insulin, short acting insulin pens or vials with insulin syringes and emergency glucagon. Patient was given DSME staff contact information and encouraged patient to contact staff with questions or concerns.    Time started: 8: 20    Time ended: 9: 30    Isabelle Ewing, RNC-AWHC, MLDE, [facility]  04/18/2023

## 2023-05-01 NOTE — PROGRESS NOTES
Chief Complaint  Diabetes (Post pump Start, a1c eval )    Referred By: No Known Provider    Subjective          Moy Granados presents to [facility] for diabetes medication management    History of Present Illness    Visit type:  follow-up  Diabetes type:  Type 2  Current diabetes status/concerns/issues:  The patient returns to the office after being started on the OmniPod insulin pump  Other health concerns: No new health concerns  Current Diabetes symptoms:    Polyuria: No   Polydipsia: No   Polyphagia: No   Blurred vision: No   Excessive fatigue: No   Known Diabetes complications:  Neuropathy: None; Location: N/A  Renal: None  Eyes: Mild Nonproliferative Retinopathy; Location: Right  Amputation/Wounds: None  GI: None  Cardiovascular: None  ED: None  Other: None  Hypoglycemia:  Level 2 (less than 54 mg/dL); Frequency - The CGM indicates 1% of glucose levels in this range  Hypoglycemia Symptoms:  shaking/tremors  Current diabetes treatment:  He is using the OmniPod 5 insulin pump with Humalog insulin    Blood glucose device:  Dexcom CGM  Blood glucose monitoring frequency:  Continuous per CGM  Blood glucose range/average: The sensor glucose average is 149 mg/dL with 77% of glucose levels following in range of 10 22% are below target and 1% are below 54 mg/dL.  The patient attributes his severe hypoglycemia to overdosing for carbohydrate intake     Glucose Source: Device Reviewed  Diet:  Carbohydrate Counting, Limits high carb/sweet foods, Avoids sugary drinks  Activity/Exercise:  He is physically active with his work    Past Medical History:   Diagnosis Date   • Type 1 diabetes      Past Surgical History:   Procedure Laterality Date   • EAR TUBES       Family History   Problem Relation Age of Onset   • Diabetes type I Other    • Diabetes type II Other      Social History     Socioeconomic History   • Marital status:    • Number of children: 2   Tobacco Use   • Smoking  "status: Former     Packs/day: 0.50     Types: Cigarettes     Quit date: 2019     Years since quittin.3   • Smokeless tobacco: Current     Types: Snuff   Vaping Use   • Vaping Use: Every day   • Substances: Flavoring   • Devices: Refillable tank   Substance and Sexual Activity   • Alcohol use: Not Currently   • Drug use: Never   • Sexual activity: Yes     Partners: Female     Comment: /wife      No Known Allergies    Current Outpatient Medications:   •  acetaminophen (TYLENOL) 500 MG tablet, Take 1 tablet by mouth Every 6 (Six) Hours As Needed for Mild Pain (prn for head aches and pther minor pain)., Disp: , Rfl:   •  Continuous Blood Gluc Sensor (Dexcom G6 Sensor), Every 10 (Ten) Days., Disp: 3 each, Rfl: 11  •  Continuous Blood Gluc Transmit (Dexcom G6 Transmitter) misc, 1 each Every 3 (Three) Months., Disp: 1 each, Rfl: 3  •  Glucagon (Gvoke HypoPen 2-Pack) 0.5 MG/0.1ML solution auto-injector, Inject 0.5 mg under the skin into the appropriate area as directed As Needed (use for severe hypoglycemia)., Disp: 0.1 mL, Rfl: 3  •  Insulin Disposable Pump (Omnipod 5 G6 Pod, Gen 5,) misc, 1 each Every 3 (Three) Days., Disp: 10 each, Rfl: 5  •  Insulin Glargine (LANTUS SOLOSTAR) 100 UNIT/ML injection pen, Inject 50 Units under the skin into the appropriate area as directed Every Night., Disp: 15 mL, Rfl: 2  •  Insulin Lispro (HumaLOG) 100 UNIT/ML injection, Inject 80 Units under the skin into the appropriate area as directed Daily. Per insulin pump, Disp: 30 mL, Rfl: 5    Objective     Vitals:    23 0945   BP: 109/83   BP Location: Right arm   Patient Position: Sitting   Cuff Size: Adult   Pulse: 97   Temp: 97.8 °F (36.6 °C)   SpO2: 96%   Weight: 92.5 kg (204 lb)   Height: 182.9 cm (72\")   PainSc:   6     Body mass index is 27.67 kg/m².    Physical Exam        Result Review :   The following data was reviewed by: KENJI Girard on 2023:    Most Recent A1C        2023    09:56   HGBA1C " Most Recent   Hemoglobin A1C 6.4         A1C Last 3 Results        9/26/2022    09:13 1/3/2023    08:40 5/2/2023    09:56   HGBA1C Last 3 Results   Hemoglobin A1C 6.5   7.5   6.4       Point-of-care A1c in the office today 6.4% indicating controlled type 1 diabetes.  This is down from the prior result of 7.5 collected in January of this year.    Glucose   Date Value Ref Range Status   05/02/2023 199 (H) 70 - 99 mg/dL Final     Comment:     Serial Number: [id number]:  [id number]     Point-of-care glucose in the office is elevated at 199 mg/dL.          Assessment: He has had improvement in his A1c and is now in a controlled status again.  He has been transition to the OmniPod 5 insulin pump and is doing very well.  He has had some hypoglycemia but attributes this to improper carbohydrate counting.      Diagnoses and all orders for this visit:    1. Overweight (BMI 25.0-29.9) (Primary)    2. 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 Insulin pump in place    4. Uses self-applied continuous glucose monitoring device        Plan: The insulin duration of 4 hours was reduced to 3 hours as recommended by the .  No other changes were made to his pump settings today.  He is to focus on accuracy of carbohydrate counting to prevent hypoglycemia.    The patient will monitor his blood glucose levels using the continuous glucose sensor.  If he develops problematic hyperglycemia or hypoglycemia or adverse drug reactions, he will contact the office for further instructions.        Follow Up     Return in about 1 month (around 6/2/2023) for he is already schedule in July .    Patient was given instructions and counseling regarding his condition or for health maintenance advice. Please see specific information pulled into the AVS if appropriate.     Kym Alfaro,  APRN  05/02/2023      Dictated Utilizing Dragon Dictation.  Please note that portions of this note were completed with a voice recognition program.  Part of this note may be an electronic transcription/translation of spoken language to printed text using the Dragon Dictation System.

## 2023-05-02 ENCOUNTER — OFFICE VISIT (OUTPATIENT)
Dept: DIABETES SERVICES | Facility: HOSPITAL | Age: 33
End: 2023-05-02
Payer: COMMERCIAL

## 2023-05-02 VITALS
TEMPERATURE: 97.8 F | HEIGHT: 72 IN | DIASTOLIC BLOOD PRESSURE: 83 MMHG | WEIGHT: 204 LBS | OXYGEN SATURATION: 96 % | BODY MASS INDEX: 27.63 KG/M2 | HEART RATE: 97 BPM | SYSTOLIC BLOOD PRESSURE: 109 MMHG

## 2023-05-02 DIAGNOSIS — E10.3291: ICD-10-CM

## 2023-05-02 DIAGNOSIS — Z96.41 INSULIN PUMP IN PLACE: ICD-10-CM

## 2023-05-02 DIAGNOSIS — Z97.8 USES SELF-APPLIED CONTINUOUS GLUCOSE MONITORING DEVICE: ICD-10-CM

## 2023-05-02 DIAGNOSIS — E66.3 OVERWEIGHT (BMI 25.0-29.9): Primary | ICD-10-CM

## 2023-05-02 LAB
EXPIRATION DATE: ABNORMAL
GLUCOSE BLDC GLUCOMTR-MCNC: 199 MG/DL (ref 70–99)
HBA1C MFR BLD: 6.4 %
Lab: ABNORMAL

## 2023-05-02 PROCEDURE — 99213 OFFICE O/P EST LOW 20 MIN: CPT | Performed by: NURSE PRACTITIONER

## 2023-05-02 PROCEDURE — G0463 HOSPITAL OUTPT CLINIC VISIT: HCPCS | Performed by: NURSE PRACTITIONER

## 2023-05-02 PROCEDURE — 95251 CONT GLUC MNTR ANALYSIS I&R: CPT | Performed by: NURSE PRACTITIONER

## 2023-05-02 PROCEDURE — 82948 REAGENT STRIP/BLOOD GLUCOSE: CPT | Performed by: NURSE PRACTITIONER

## 2024-01-09 DIAGNOSIS — E10.65 UNCONTROLLED TYPE 1 DIABETES MELLITUS WITH HYPERGLYCEMIA: ICD-10-CM

## 2024-01-09 RX ORDER — INSULIN LISPRO 100 [IU]/ML
INJECTION, SOLUTION INTRAVENOUS; SUBCUTANEOUS
Qty: 30 ML | Refills: 5 | Status: SHIPPED | OUTPATIENT
Start: 2024-01-09

## 2024-01-18 NOTE — PROGRESS NOTES
"Chief Complaint  Diabetes (Follow up, med mgt, A1c eval, cgm/pump eval )    Referred By: No Known Provider    Subjective          Moy Granados presents to [facility] for diabetes medication management    History of Present Illness    Visit type:  follow-up  Diabetes type:  Type 1  Current diabetes status/concerns/issues:  He has had some \"lost sensor\" issues with his pump  Other health concerns: No new health concerns  Current Diabetes symptoms:    Polyuria: No   Polydipsia: No   Polyphagia: No   Blurred vision: No   Excessive fatigue: No  Known Diabetes complications:  Neuropathy: None; Location: N/A  Renal: None  Eyes: Mild Nonproliferative Retinopathy; Location: Right  Amputation/Wounds: None  GI: None  Cardiovascular: None  ED: None  Other: None  Hypoglycemia:  Level 1 hypoglycemia (54 mg/dL - 70 mg/dL); Frequency - 1% per CGM  Hypoglycemia Symptoms:  shaking/tremors  Current diabetes treatment:  He is using the OmniPod 5 insulin pump with Humalog insulin.  He is using approximately 65 units of insulin each day  Blood glucose device:  Dexcom CGM  Blood glucose monitoring frequency:  Continuous per CGM  Blood glucose range/average:  The 14-day sensor report shows an average glucose of 176 mg/dL, with 61% in target range ( mgdL), 24% in the high range (181-250 mg/dL), 14% in the very high range (>250 mg/dL), 1% in the low range (54-70 mg/dL) and 0% in the very low range (<54 mg/dL).   Glucose Source: Device Reviewed  Diet:  Carbohydrate Counting, Limits high carb/sweet foods, Avoids sugary drinks  Activity/Exercise:   He is active with work    Past Medical History:   Diagnosis Date    Type 1 diabetes      Past Surgical History:   Procedure Laterality Date    EAR TUBES       Family History   Problem Relation Age of Onset    Diabetes type I Other     Diabetes type II Other      Social History     Socioeconomic History    Marital status:     Number of children: 2 " "  Tobacco Use    Smoking status: Former     Packs/day: .5     Types: Cigarettes     Quit date: 2019     Years since quittin.0    Smokeless tobacco: Current     Types: Snuff   Vaping Use    Vaping Use: Every day    Substances: Flavoring    Devices: Refillable tank   Substance and Sexual Activity    Alcohol use: Not Currently    Drug use: Never    Sexual activity: Yes     Partners: Female     Comment: /wife      No Known Allergies    Current Outpatient Medications:     acetaminophen (TYLENOL) 500 MG tablet, Take 1 tablet by mouth Every 6 (Six) Hours As Needed for Mild Pain (prn for head aches and pther minor pain)., Disp: , Rfl:     Continuous Blood Gluc Sensor (Dexcom G6 Sensor), Every 10 (Ten) Days., Disp: 3 each, Rfl: 11    Continuous Blood Gluc Transmit (Dexcom G6 Transmitter) misc, 1 each Every 3 (Three) Months., Disp: 1 each, Rfl: 3    Glucagon (Gvoke HypoPen 2-Pack) 0.5 MG/0.1ML solution auto-injector, Inject 0.5 mg under the skin into the appropriate area as directed As Needed (use for severe hypoglycemia)., Disp: 0.1 mL, Rfl: 3    HumaLOG 100 UNIT/ML injection, USE UP TO 80 UNITS PER DAY VIA INSULIN PUMP, Disp: 30 mL, Rfl: 5    Insulin Disposable Pump (Omnipod 5 G6 Pod, Gen 5,) misc, Use 1 each Every Other Day., Disp: 15 each, Rfl: 5    Objective     Vitals:    24 0812   BP: 133/84   BP Location: Left arm   Patient Position: Sitting   Cuff Size: Adult   Pulse: 95   SpO2: 98%   Weight: 88.5 kg (195 lb)   Height: 182.9 cm (72\")   PainSc: 0-No pain     Body mass index is 26.45 kg/m².    Physical Exam  Constitutional:       Appearance: Normal appearance.      Comments: Overweight (BMI 25 - 29.9) Pt Current BMI = 26.45    HENT:      Head: Normocephalic and atraumatic.      Right Ear: External ear normal.      Left Ear: External ear normal.      Nose: Nose normal.   Eyes:      Extraocular Movements: Extraocular movements intact.      Conjunctiva/sclera: Conjunctivae normal.   Pulmonary:      " Effort: Pulmonary effort is normal.   Musculoskeletal:         General: Normal range of motion.      Cervical back: Normal range of motion.   Skin:     General: Skin is warm and dry.   Neurological:      General: No focal deficit present.      Mental Status: He is alert and oriented to person, place, and time. Mental status is at baseline.   Psychiatric:         Mood and Affect: Mood normal.         Behavior: Behavior normal.         Thought Content: Thought content normal.         Judgment: Judgment normal.             Result Review :   The following data was reviewed by: KENJI Girard on 01/22/2024:    Most Recent A1C          1/22/2024    08:16   HGBA1C Most Recent   Hemoglobin A1C 6.8        A1C Last 3 Results          5/2/2023    09:56 7/11/2023    07:24 1/22/2024    08:16   HGBA1C Last 3 Results   Hemoglobin A1C 6.4  6.4  6.8      A1c collected in the office today is 6.8%, indicating Controlled Type I diabetes.  This result is up from the prior result of 6.4% collected on 7/11/23     Glucose   Date Value Ref Range Status   01/22/2024 98 70 - 99 mg/dL Final     Comment:     Serial Number: [id number]:  [id number]     Point of care glucose in the office today is within normal limits for nonfasting glucose            Assessment: His A1c has increased but remains in a controlled status.  He is having some post prandial hyperglycemia.      Diagnoses and all orders for this visit:    1. 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  Dispense: 15 each; Refill: 5    2. Insulin pump in place    3. Insulin  pump titration    4. Uses self-applied continuous glucose monitoring device    Other orders  -     POC Glucose        Plan: To help minimize post prandial hyperglycemia in the mid-day, the 4 AM - 12 PM carb ratio of 1:12 was changed to 1:10.  The reverse correction was also disabled.  We will also collect routine labs.    The patient will monitor his blood glucose levels using his CGM.  If he develops problematic hyperglycemia or hypoglycemia or adverse drug reactions, he will contact the office for further instructions.        Follow Up     Return in about 3 months (around 4/22/2024) for Pump Eval, CGM Follow-up.    Patient was given instructions and counseling regarding his condition or for health maintenance advice. Please see specific information pulled into the AVS if appropriate.     Kym Alfaro, APRN  01/22/2024      Dictated Utilizing Dragon Dictation.  Please note that portions of this note were completed with a voice recognition program.  Part of this note may be an electronic transcription/translation of spoken language to printed text using the Dragon Dictation System.

## 2024-01-22 ENCOUNTER — OFFICE VISIT (OUTPATIENT)
Dept: DIABETES SERVICES | Facility: HOSPITAL | Age: 34
End: 2024-01-22
Payer: COMMERCIAL

## 2024-01-22 VITALS
HEART RATE: 95 BPM | HEIGHT: 72 IN | OXYGEN SATURATION: 98 % | SYSTOLIC BLOOD PRESSURE: 133 MMHG | BODY MASS INDEX: 26.41 KG/M2 | WEIGHT: 195 LBS | DIASTOLIC BLOOD PRESSURE: 84 MMHG

## 2024-01-22 DIAGNOSIS — E10.3291: Primary | ICD-10-CM

## 2024-01-22 DIAGNOSIS — Z96.41 INSULIN PUMP IN PLACE: ICD-10-CM

## 2024-01-22 DIAGNOSIS — Z46.81 INSULIN PUMP TITRATION: ICD-10-CM

## 2024-01-22 DIAGNOSIS — Z97.8 USES SELF-APPLIED CONTINUOUS GLUCOSE MONITORING DEVICE: ICD-10-CM

## 2024-01-22 LAB
EXPIRATION DATE: ABNORMAL
GLUCOSE BLDC GLUCOMTR-MCNC: 98 MG/DL (ref 70–99)
HBA1C MFR BLD: 6.8 % (ref 4.5–5.7)
Lab: ABNORMAL

## 2024-01-22 PROCEDURE — 82948 REAGENT STRIP/BLOOD GLUCOSE: CPT | Performed by: NURSE PRACTITIONER

## 2024-01-22 PROCEDURE — G0463 HOSPITAL OUTPT CLINIC VISIT: HCPCS | Performed by: NURSE PRACTITIONER

## 2024-01-22 PROCEDURE — 99214 OFFICE O/P EST MOD 30 MIN: CPT | Performed by: NURSE PRACTITIONER

## 2024-01-22 PROCEDURE — 95251 CONT GLUC MNTR ANALYSIS I&R: CPT | Performed by: NURSE PRACTITIONER

## 2024-01-22 RX ORDER — INSULIN PMP CART,AUT,G6/7,CNTR
1 EACH SUBCUTANEOUS
Qty: 15 EACH | Refills: 5 | Status: SHIPPED | OUTPATIENT
Start: 2024-01-22

## 2024-04-04 DIAGNOSIS — E10.65 UNCONTROLLED TYPE 1 DIABETES MELLITUS WITH HYPERGLYCEMIA: ICD-10-CM

## 2024-04-04 RX ORDER — PROCHLORPERAZINE 25 MG/1
1 SUPPOSITORY RECTAL
Qty: 1 EACH | Refills: 3 | Status: SHIPPED | OUTPATIENT
Start: 2024-04-04

## 2024-04-06 DIAGNOSIS — E10.65 UNCONTROLLED TYPE 1 DIABETES MELLITUS WITH HYPERGLYCEMIA: ICD-10-CM

## 2024-04-08 RX ORDER — PROCHLORPERAZINE 25 MG/1
SUPPOSITORY RECTAL
Qty: 3 EACH | Refills: 11 | Status: SHIPPED | OUTPATIENT
Start: 2024-04-08

## 2024-04-09 ENCOUNTER — TELEPHONE (OUTPATIENT)
Dept: DIABETES SERVICES | Facility: HOSPITAL | Age: 34
End: 2024-04-09
Payer: COMMERCIAL

## 2024-04-09 NOTE — TELEPHONE ENCOUNTER
ERIC DUONG (Key: [id number])Drug  Dexcom G6 SensorOutcome  Additional Information Required  Available without authorization.

## 2024-04-22 ENCOUNTER — OFFICE VISIT (OUTPATIENT)
Dept: DIABETES SERVICES | Facility: HOSPITAL | Age: 34
End: 2024-04-22
Payer: COMMERCIAL

## 2024-04-22 ENCOUNTER — TELEPHONE (OUTPATIENT)
Dept: DIABETES SERVICES | Facility: HOSPITAL | Age: 34
End: 2024-04-22
Payer: COMMERCIAL

## 2024-04-22 VITALS
HEART RATE: 90 BPM | BODY MASS INDEX: 27.36 KG/M2 | OXYGEN SATURATION: 96 % | SYSTOLIC BLOOD PRESSURE: 125 MMHG | WEIGHT: 202 LBS | HEIGHT: 72 IN | DIASTOLIC BLOOD PRESSURE: 79 MMHG

## 2024-04-22 DIAGNOSIS — Z96.41 INSULIN PUMP IN PLACE: ICD-10-CM

## 2024-04-22 DIAGNOSIS — E10.3291: Primary | ICD-10-CM

## 2024-04-22 DIAGNOSIS — Z46.81 INSULIN PUMP TITRATION: ICD-10-CM

## 2024-04-22 DIAGNOSIS — Z97.8 USES SELF-APPLIED CONTINUOUS GLUCOSE MONITORING DEVICE: ICD-10-CM

## 2024-04-22 DIAGNOSIS — E10.65 UNCONTROLLED TYPE 1 DIABETES MELLITUS WITH HYPERGLYCEMIA: Primary | ICD-10-CM

## 2024-04-22 LAB
EXPIRATION DATE: ABNORMAL
GLUCOSE BLDC GLUCOMTR-MCNC: 211 MG/DL (ref 70–99)
HBA1C MFR BLD: 6.9 % (ref 4.5–5.7)
Lab: ABNORMAL

## 2024-04-22 PROCEDURE — G0463 HOSPITAL OUTPT CLINIC VISIT: HCPCS | Performed by: NURSE PRACTITIONER

## 2024-04-22 PROCEDURE — 95251 CONT GLUC MNTR ANALYSIS I&R: CPT | Performed by: NURSE PRACTITIONER

## 2024-04-22 PROCEDURE — 99213 OFFICE O/P EST LOW 20 MIN: CPT | Performed by: NURSE PRACTITIONER

## 2024-04-22 PROCEDURE — 82948 REAGENT STRIP/BLOOD GLUCOSE: CPT | Performed by: NURSE PRACTITIONER

## 2024-04-22 PROCEDURE — 83036 HEMOGLOBIN GLYCOSYLATED A1C: CPT | Performed by: NURSE PRACTITIONER

## 2024-04-22 NOTE — TELEPHONE ENCOUNTER
PTS PHARM WAS CALLED IN \REGARDS TO HIS DEXCOM SENSORS, PHARMACY WAS RUNNING THE QUANTITY AND DAYS SUPPLY INCORRECTLY, WENT THROUGH 3 FOR 30 AT 73.66$ COPAY, PT WAS NOTIFIED AS WELL 4-22-24

## 2024-07-23 DIAGNOSIS — E10.3291: ICD-10-CM

## 2024-07-23 RX ORDER — INSULIN PMP CART,AUT,G6/7,CNTR
1 EACH SUBCUTANEOUS
Qty: 15 EACH | Refills: 5 | Status: SHIPPED | OUTPATIENT
Start: 2024-07-23

## 2024-08-31 DIAGNOSIS — E10.65 UNCONTROLLED TYPE 1 DIABETES MELLITUS WITH HYPERGLYCEMIA: ICD-10-CM

## 2024-09-02 RX ORDER — INSULIN LISPRO 100 [IU]/ML
INJECTION, SOLUTION INTRAVENOUS; SUBCUTANEOUS
Qty: 30 ML | Refills: 0 | Status: SHIPPED | OUTPATIENT
Start: 2024-09-02

## 2024-09-04 ENCOUNTER — TELEPHONE (OUTPATIENT)
Dept: DIABETES SERVICES | Facility: HOSPITAL | Age: 34
End: 2024-09-04
Payer: COMMERCIAL

## 2024-09-04 NOTE — TELEPHONE ENCOUNTER
HUB TO RELAY        Left detailed message reminding pt to have fasting labs drawn prior to next appt ok per verbal.

## 2024-10-08 ENCOUNTER — OFFICE VISIT (OUTPATIENT)
Dept: DIABETES SERVICES | Facility: HOSPITAL | Age: 34
End: 2024-10-08
Payer: COMMERCIAL

## 2024-10-08 VITALS
HEIGHT: 72 IN | HEART RATE: 82 BPM | OXYGEN SATURATION: 98 % | RESPIRATION RATE: 18 BRPM | SYSTOLIC BLOOD PRESSURE: 129 MMHG | WEIGHT: 203 LBS | DIASTOLIC BLOOD PRESSURE: 80 MMHG | BODY MASS INDEX: 27.5 KG/M2

## 2024-10-08 DIAGNOSIS — Z96.41 INSULIN PUMP IN PLACE: ICD-10-CM

## 2024-10-08 DIAGNOSIS — E10.3291: Primary | ICD-10-CM

## 2024-10-08 DIAGNOSIS — Z97.8 USES SELF-APPLIED CONTINUOUS GLUCOSE MONITORING DEVICE: ICD-10-CM

## 2024-10-08 LAB
ALBUMIN UR-MCNC: <1.2 MG/DL
CREAT UR-MCNC: 60.9 MG/DL
EXPIRATION DATE: ABNORMAL
GLUCOSE BLDC GLUCOMTR-MCNC: 110 MG/DL (ref 70–99)
HBA1C MFR BLD: 7 % (ref 4.5–5.7)
Lab: ABNORMAL
MICROALBUMIN/CREAT UR: NORMAL MG/G{CREAT}

## 2024-10-08 PROCEDURE — G0463 HOSPITAL OUTPT CLINIC VISIT: HCPCS | Performed by: NURSE PRACTITIONER

## 2024-10-08 PROCEDURE — 83036 HEMOGLOBIN GLYCOSYLATED A1C: CPT | Performed by: NURSE PRACTITIONER

## 2024-10-08 PROCEDURE — 95251 CONT GLUC MNTR ANALYSIS I&R: CPT | Performed by: NURSE PRACTITIONER

## 2024-10-08 PROCEDURE — 82570 ASSAY OF URINE CREATININE: CPT | Performed by: NURSE PRACTITIONER

## 2024-10-08 PROCEDURE — 82948 REAGENT STRIP/BLOOD GLUCOSE: CPT | Performed by: NURSE PRACTITIONER

## 2024-10-08 PROCEDURE — 99214 OFFICE O/P EST MOD 30 MIN: CPT | Performed by: NURSE PRACTITIONER

## 2024-10-08 PROCEDURE — 82043 UR ALBUMIN QUANTITATIVE: CPT | Performed by: NURSE PRACTITIONER

## 2024-10-08 RX ORDER — INSULIN LISPRO 100 [IU]/ML
100 INJECTION, SOLUTION INTRAVENOUS; SUBCUTANEOUS DAILY
Qty: 30 ML | Refills: 5 | Status: SHIPPED | OUTPATIENT
Start: 2024-10-08 | End: 2025-04-06

## 2024-10-08 NOTE — PROGRESS NOTES
Chief Complaint  Med Management, a1c evaluation, and cgm evaluation    Referred By: LUIS ALBERTO Girard*    Subjective          Moy Granados presents to [facility] for insulin pump management    History of Present Illness    Visit type:  follow-up  Diabetes type:  Type 1  Current diabetes status/concerns/issues: He is having some highs and lows  Other health concerns: No new health concerns  Current Diabetes symptoms:    Polyuria: No   Polydipsia: No   Polyphagia: No   Blurred vision: No   Excessive fatigue: No  Known Diabetes complications:  Neuropathy: None; Location: N/A  Renal: No current eGFR available and No current urine microalbumin available  Eyes: No current eye exam available in record; Location: N/A  Amputation/Wounds: None  GI: None  Cardiovascular: None  ED: None  Other: None  Hypoglycemia:  Level 1 hypoglycemia (54 mg/dL - 70 mg/dL); Frequency - 1% per CGM  Hypoglycemia Symptoms:  shaking/tremors  Current diabetes treatment:   He is using the OmniPod 5 insulin pump with Humalog insulin.  He is using approximately 65 units of insulin each day    Blood glucose device:  Dexcom CGM  Blood glucose monitoring frequency:  Continuous per CGM  Blood glucose range/average:  The 14-day sensor report shows an average glucose of 183 mg/dL, with 52% in target range ( mgdL), 31% in the high range (181-250 mg/dL), 16% in the very high range (>250 mg/dL), 1% in the low range (54-70 mg/dL) and 0% in the very low range (<54 mg/dL).   Glucose Source: Device Reviewed  Diet:  Carbohydrate Counting, Limits high carb/sweet foods, Avoids sugary drinks  Activity/Exercise:   Active with work    Past Medical History:   Diagnosis Date    Type 1 diabetes      Past Surgical History:   Procedure Laterality Date    EAR TUBES       Family History   Problem Relation Age of Onset    Diabetes type I Other     Diabetes type II Other      Social History     Socioeconomic History    Marital  "status:     Number of children: 2   Tobacco Use    Smoking status: Former     Current packs/day: 0.00     Types: Cigarettes     Quit date: 2019     Years since quittin.7    Smokeless tobacco: Current     Types: Snuff   Vaping Use    Vaping status: Former    Start date: 10/1/2023    Substances: Flavoring    Devices: Refillable tank   Substance and Sexual Activity    Alcohol use: Not Currently    Drug use: Never    Sexual activity: Yes     Partners: Female     Comment: /wife      No Known Allergies    Current Outpatient Medications:     acetaminophen (TYLENOL) 500 MG tablet, Take 1 tablet by mouth Every 6 (Six) Hours As Needed for Mild Pain (prn for head aches and pther minor pain)., Disp: , Rfl:     Continuous Blood Gluc Sensor (Dexcom G6 Sensor), USE AS DIRECTED EVERY 10 DAYS, Disp: 3 each, Rfl: 11    Continuous Blood Gluc Transmit (Dexcom G6 Transmitter) misc, 1 EACH EVERY 3 (THREE) MONTHS., Disp: 1 each, Rfl: 3    Glucagon (Gvoke HypoPen 2-Pack) 0.5 MG/0.1ML solution auto-injector, Inject 0.5 mg under the skin into the appropriate area as directed As Needed (use for severe hypoglycemia)., Disp: 0.1 mL, Rfl: 3    Insulin Disposable Pump (Omnipod 5 G6 Pods, Gen 5,) misc, USE 1 EACH EVERY OTHER DAY., Disp: 15 each, Rfl: 5    Insulin Lispro (HumaLOG) 100 UNIT/ML injection, Inject 100 Units under the skin into the appropriate area as directed Daily for 180 days., Disp: 30 mL, Rfl: 5    Objective     Vitals:    10/08/24 0831   BP: 129/80   Pulse: 82   Resp: 18   SpO2: 98%   Weight: 92.1 kg (203 lb)   Height: 182.9 cm (72\")     Body mass index is 27.53 kg/m².    Physical Exam  Constitutional:       Appearance: Normal appearance.      Comments: Overweight (BMI 25 - 29.9) Pt Current BMI = 27.53    HENT:      Head: Normocephalic and atraumatic.      Right Ear: External ear normal.      Left Ear: External ear normal.      Nose: Nose normal.   Eyes:      Extraocular Movements: Extraocular movements intact. "      Conjunctiva/sclera: Conjunctivae normal.   Pulmonary:      Effort: Pulmonary effort is normal.   Musculoskeletal:         General: Normal range of motion.      Cervical back: Normal range of motion.   Skin:     General: Skin is warm and dry.   Neurological:      General: No focal deficit present.      Mental Status: He is alert and oriented to person, place, and time. Mental status is at baseline.   Psychiatric:         Mood and Affect: Mood normal.         Behavior: Behavior normal.         Thought Content: Thought content normal.         Judgment: Judgment normal.             Result Review :   The following data was reviewed by: KENJI Girard on 10/08/2024:    Most Recent A1C          10/8/2024    08:47   HGBA1C Most Recent   Hemoglobin A1C 7.0        A1C Last 3 Results          1/22/2024    08:16 4/22/2024    08:51 10/8/2024    08:47   HGBA1C Last 3 Results   Hemoglobin A1C 6.8  6.9  7.0      A1c collected in the office today is 7.0%, indicating Controlled Type I diabetes.  This result is up from the prior result of 6.9% collected on 4/22/24     Glucose   Date Value Ref Range Status   10/08/2024 110 (H) 70 - 99 mg/dL Final     Comment:     Serial Number: [id number]:  [id number]     Point of care glucose in the office today is within normal limits for nonfasting glucose              Assessment: He has had a slight increase in his A1c since last being seen.  The CGM report shows some postprandial hyperglycemia typically occurring because of late meal bolusing or missed meal bolusing.      Diagnoses and all orders for this visit:    1. 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  Dispense: 30 mL; Refill: 5    2. Insulin pump in place    3. Uses self-applied continuous glucose monitoring device        Plan: No changes were made to the pump settings today.  The patient is to focus on entering carbohydrates routinely with every meal.  He will be scheduled for routine follow-up appointment.    The patient will monitor his blood glucose levels using his CGM.  If he develops problematic hyperglycemia or hypoglycemia or adverse drug reactions, he will contact the office for further instructions.        Follow Up     Return in about 3 months (around 1/8/2025) for Pump Eval, CGM Follow-up.    Patient was given instructions and counseling regarding his condition or for health maintenance advice. Please see specific information pulled into the AVS if appropriate.     Kym Alfaro, KENJI  10/08/2024      Dictated Utilizing Dragon Dictation.  Please note that portions of this note were completed with a voice recognition program.  Part of this note may be an electronic transcription/translation of spoken language to printed text using the Dragon Dictation System.

## 2025-01-08 NOTE — PROGRESS NOTES
Chief Complaint  Diabetes (Med mgt, A1c eval, cgm pump eval)    Referred By: LUIS ALBERTO Girard*    Subjective          Patient or patient representative verbalized consent for the use of Ambient Listening during the visit with  KENJI Girard for chart documentation. 1/13/2025  09:16 TRISTIAN Granados presents to [facility] for insulin pump management    History of Present Illness    Visit type:  follow-up  Diabetes type:  Type 1  Current diabetes status/concerns/issues:     History of Present Illness  The patient presents for evaluation of diabetes.    He reports no significant concerns related to his diabetes management. He has been experiencing occasional hypoglycemic episodes. He experienced a hypoglycemic episode yesterday, the cause of which remains unclear to him. He does not believe he is overestimating his carbohydrate intake. His insulin pump appears to be functioning appropriately, suspending insulin delivery when his blood glucose levels begin to drop. He reports experiencing symptoms such as weakness and shakiness during these hypoglycemic episodes, often before his sensor alerts him. He reports no new health issues since his last visit.      Current Diabetes symptoms:    Polyuria: No   Polydipsia: No   Polyphagia: No   Blurred vision: No   Excessive fatigue: No  Known Diabetes complications:  Neuropathy: None; Location: N/A  Renal: No current eGFR available and Microalbuminuria - NEGATIVE  Eyes: No current eye exam available in record; Location: N/A  Amputation/Wounds: None  GI: None  Cardiovascular: None  ED: None  Other: None  Hypoglycemia:  Level 2 (less than 54 mg/dL); Frequency - 1% per CGM  Hypoglycemia Symptoms:  shaking/tremors and weakness  Current diabetes treatment:   He is using the OmniPod 5 insulin pump with Humalog insulin.  He is using approximately 65 units of insulin each day    Blood glucose device:  Dexcom CGM  Blood glucose  monitoring frequency:  Continuous per CGM  Blood glucose range/average:  The 14-day sensor report shows an average glucose of 187 mg/dL, with 55% in target range ( mgdL), 26% in the high range (181-250 mg/dL), 18% in the very high range (>250 mg/dL), 0% in the low range (54-70 mg/dL) and 1% in the very low range (<54 mg/dL).   Glucose Source: Device Reviewed  Diet:  Carbohydrate Counting, Limits high carb/sweet foods, Avoids sugary drinks  Activity/Exercise:   Active with work    Past Medical History:   Diagnosis Date    Type 1 diabetes      Past Surgical History:   Procedure Laterality Date    EAR TUBES       Family History   Problem Relation Age of Onset    Diabetes type I Other     Diabetes type II Other      Social History     Socioeconomic History    Marital status:     Number of children: 2   Tobacco Use    Smoking status: Former     Current packs/day: 0.00     Types: Cigarettes     Quit date:      Years since quittin.0    Smokeless tobacco: Current     Types: Snuff   Vaping Use    Vaping status: Former    Start date: 10/1/2023    Substances: Flavoring    Devices: [employer] tank   Substance and Sexual Activity    Alcohol use: Not Currently    Drug use: Never    Sexual activity: Yes     Partners: Female     Comment: /wife      No Known Allergies    Current Outpatient Medications:     acetaminophen (TYLENOL) 500 MG tablet, Take 1 tablet by mouth Every 6 (Six) Hours As Needed for Mild Pain (prn for head aches and pther minor pain)., Disp: , Rfl:     Continuous Blood Gluc Sensor (Dexcom G6 Sensor), USE AS DIRECTED EVERY 10 DAYS, Disp: 3 each, Rfl: 11    Continuous Blood Gluc Transmit (Dexcom G6 Transmitter) misc, 1 EACH EVERY 3 (THREE) MONTHS., Disp: 1 each, Rfl: 3    Glucagon (Gvoke HypoPen 2-Pack) 0.5 MG/0.1ML solution auto-injector, Inject 0.5 mg under the skin into the appropriate area as directed As Needed (use for severe hypoglycemia)., Disp: 0.1 mL, Rfl: 3    Insulin Disposable  "Pump (Omnipod 5 [id number] Pods Gen 5) misc, Use 1 each Every Other Day., Disp: 15 each, Rfl: 5    Insulin Lispro (HumaLOG) 100 UNIT/ML injection, Inject 100 Units under the skin into the appropriate area as directed Daily for 180 days., Disp: 30 mL, Rfl: 5    Objective     Vitals:    01/09/25 0844   BP: 112/75   Pulse: 69   SpO2: 98%   Weight: 92.1 kg (203 lb)   Height: 182.9 cm (72\")     Body mass index is 27.53 kg/m².    Physical Exam  Constitutional:       Appearance: Normal appearance.      Comments: Overweight (BMI 25 - 29.9) Pt Current BMI = 27.53    HENT:      Head: Normocephalic and atraumatic.      Right Ear: External ear normal.      Left Ear: External ear normal.      Nose: Nose normal.   Eyes:      Extraocular Movements: Extraocular movements intact.      Conjunctiva/sclera: Conjunctivae normal.   Pulmonary:      Effort: Pulmonary effort is normal.   Musculoskeletal:         General: Normal range of motion.      Cervical back: Normal range of motion.   Skin:     General: Skin is warm and dry.   Neurological:      General: No focal deficit present.      Mental Status: He is alert and oriented to person, place, and time. Mental status is at baseline.   Psychiatric:         Mood and Affect: Mood normal.         Behavior: Behavior normal.         Thought Content: Thought content normal.         Judgment: Judgment normal.             Result Review :   The following data was reviewed by: KENJI Girard on 01/09/2025:    Most Recent A1C          1/9/2025    08:54   HGBA1C Most Recent   Hemoglobin A1C 6.9        A1C Last 3 Results          4/22/2024    08:51 10/8/2024    08:47 1/9/2025    08:54   HGBA1C Last 3 Results   Hemoglobin A1C 6.9  7.0  6.9      A1c collected in the office today is 6.9%, indicating Controlled Type I diabetes.  This result is down from the prior result of 7% collected on 10/8/24     Glucose   Date Value Ref Range Status   01/09/2025 137 (H) 70 - 99 mg/dL Final     Comment:     " Serial Number: [id number]:  [id number]     Point of care glucose in the office today is within normal limits for nonfasting glucose      Microalbumin, Urine   Date Value Ref Range Status   10/08/2024 <1.2 mg/dL Final     Creatinine, Urine   Date Value Ref Range Status   10/08/2024 60.9 mg/dL Final     Microalbumin/Creatinine Ratio   Date Value Ref Range Status   10/08/2024   Final     Comment:     Unable to calculate     Urine microalbuminuria collected on 10/8/24 is negative for microalbuminuria                  Diagnoses and all orders for this visit:    1. Controlled diabetes mellitus type 1 without complications (Primary)  -     POC Glucose  -     POC Glycosylated Hemoglobin (Hb A1C)  -     Insulin Disposable Pump (Omnipod 5 [id number] Pods Gen 5) misc; Use 1 each Every Other Day.  Dispense: 15 each; Refill: 5    2. Insulin pump in place    3. Uses self-applied continuous glucose monitoring device        Assessment & Plan  1. Diabetes mellitus.  His average glucose level is 187, with 55% of readings within the target range. He occasionally experiences hypoglycemic episodes, which are managed by his insulin pump suspending insulin delivery. His A1c level has improved from 7.0 in October to 6.9 currently. The urine microalbumin test conducted during the last appointment showed no proteinuria, indicating no kidney damage from diabetes.         The patient will monitor his blood glucose levels per continuous glucose monitor.  If he develops problematic hyperglycemia or hypoglycemia or adverse drug reactions, he will contact the office for further instructions.        Follow Up     Return in about 3 months (around 4/9/2025) for Pump Eval, CGM Follow-up.    Patient was given instructions and counseling regarding his condition or for health maintenance advice. Please see specific information pulled into the AVS if appropriate.     Kym Alfaro, KENJI  01/09/2025        Dictated Utilizing Dragon Dictation.   Please note that portions of this note were completed with a voice recognition program.  Part of this note may be an electronic transcription/translation of spoken language to printed text using the Dragon Dictation System.

## 2025-01-09 ENCOUNTER — OFFICE VISIT (OUTPATIENT)
Dept: DIABETES SERVICES | Facility: HOSPITAL | Age: 35
End: 2025-01-09
Payer: COMMERCIAL

## 2025-01-09 VITALS
BODY MASS INDEX: 27.5 KG/M2 | DIASTOLIC BLOOD PRESSURE: 75 MMHG | HEART RATE: 69 BPM | WEIGHT: 203 LBS | SYSTOLIC BLOOD PRESSURE: 112 MMHG | OXYGEN SATURATION: 98 % | HEIGHT: 72 IN

## 2025-01-09 DIAGNOSIS — Z96.41 INSULIN PUMP IN PLACE: ICD-10-CM

## 2025-01-09 DIAGNOSIS — Z97.8 USES SELF-APPLIED CONTINUOUS GLUCOSE MONITORING DEVICE: ICD-10-CM

## 2025-01-09 DIAGNOSIS — E10.9 CONTROLLED DIABETES MELLITUS TYPE 1 WITHOUT COMPLICATIONS: Primary | ICD-10-CM

## 2025-01-09 LAB
EXPIRATION DATE: ABNORMAL
GLUCOSE BLDC GLUCOMTR-MCNC: 137 MG/DL (ref 70–99)
HBA1C MFR BLD: 6.9 % (ref 4.5–5.7)
Lab: ABNORMAL

## 2025-01-09 PROCEDURE — 82948 REAGENT STRIP/BLOOD GLUCOSE: CPT | Performed by: NURSE PRACTITIONER

## 2025-01-09 PROCEDURE — 95251 CONT GLUC MNTR ANALYSIS I&R: CPT | Performed by: NURSE PRACTITIONER

## 2025-01-09 PROCEDURE — G0463 HOSPITAL OUTPT CLINIC VISIT: HCPCS | Performed by: NURSE PRACTITIONER

## 2025-01-09 PROCEDURE — 83036 HEMOGLOBIN GLYCOSYLATED A1C: CPT | Performed by: NURSE PRACTITIONER

## 2025-01-09 PROCEDURE — 99213 OFFICE O/P EST LOW 20 MIN: CPT | Performed by: NURSE PRACTITIONER

## 2025-01-09 RX ORDER — INSULIN PMP CART,AUT,G6/7,CNTR
1 EACH SUBCUTANEOUS
Qty: 15 EACH | Refills: 5 | Status: SHIPPED | OUTPATIENT
Start: 2025-01-09

## 2025-02-08 ENCOUNTER — HOSPITAL ENCOUNTER (EMERGENCY)
Facility: HOSPITAL | Age: 35
Discharge: HOME | End: 2025-02-08
Attending: EMERGENCY MEDICINE
Payer: COMMERCIAL

## 2025-02-08 VITALS
HEIGHT: 72 IN | RESPIRATION RATE: 18 BRPM | TEMPERATURE: 98.4 F | HEART RATE: 87 BPM | OXYGEN SATURATION: 100 % | WEIGHT: 203.04 LBS | SYSTOLIC BLOOD PRESSURE: 113 MMHG | DIASTOLIC BLOOD PRESSURE: 76 MMHG | BODY MASS INDEX: 27.5 KG/M2

## 2025-02-08 DIAGNOSIS — L05.91 PILONIDAL CYST WITHOUT ABSCESS: Primary | ICD-10-CM

## 2025-02-08 PROCEDURE — 99282 EMERGENCY DEPT VISIT SF MDM: CPT

## 2025-02-08 RX ORDER — SULFAMETHOXAZOLE AND TRIMETHOPRIM 800; 160 MG/1; MG/1
1 TABLET ORAL 2 TIMES DAILY
Qty: 20 TABLET | Refills: 0 | Status: SHIPPED | OUTPATIENT
Start: 2025-02-08

## 2025-02-08 NOTE — ED PROVIDER NOTES
Time: 6:01 PM EST  Date of encounter:  2/8/2025  Independent Historian/Clinical History and Information was obtained by:   Patient    History is limited by: N/A    Chief Complaint: Cyst      History of Present Illness:  Patient is a 34 y.o. year old male who presents to the emergency department for evaluation of cyst.  Patient presents the emergency department today for pilonidal cyst.  He states he has had this occur in the past approximately 5 years ago and he was told to follow-up with a surgeon but he never did.  He states for the past few days she has had worsening pain especially with sitting, some with standing.  He has not been able to get any drainage from the area at home.  Patient is denying any fever.  He states the area does keep becoming more painful.      Patient Care Team  Primary Care Provider: Provider, No Known    Past Medical History:     No Known Allergies  Past Medical History:   Diagnosis Date    Type 1 diabetes      Past Surgical History:   Procedure Laterality Date    EAR TUBES       Family History   Problem Relation Age of Onset    Diabetes type I Other     Diabetes type II Other        Home Medications:  Prior to Admission medications    Medication Sig Start Date End Date Taking? Authorizing Provider   acetaminophen (TYLENOL) 500 MG tablet Take 1 tablet by mouth Every 6 (Six) Hours As Needed for Mild Pain (prn for head aches and pther minor pain).    Provider, Historical, MD   Continuous Blood Gluc Sensor (Dexcom G6 Sensor) USE AS DIRECTED EVERY 10 DAYS 4/8/24   Kym Alfaro APRN   Continuous Blood Gluc Transmit (Dexcom G6 Transmitter) misc 1 EACH EVERY 3 (THREE) MONTHS. 4/4/24   Kym Alfaro APRN   Glucagon (Gvoke HypoPen 2-Pack) 0.5 MG/0.1ML solution auto-injector Inject 0.5 mg under the skin into the appropriate area as directed As Needed (use for severe hypoglycemia). 8/18/21   Kym Alfaro APRN   Insulin Disposable Pump (Omnipod 5 [id number] Pods Gen 5) misc  "Use 1 each Every Other Day. 25   Dominic KENJI Lin   Insulin Lispro (HumaLOG) 100 UNIT/ML injection Inject 100 Units under the skin into the appropriate area as directed Daily for 180 days. 10/8/24 4/6/25  Kym Alfaro KENJI Muller        Social History:   Social History     Tobacco Use    Smoking status: Former     Current packs/day: 0.00     Types: Cigarettes     Quit date:      Years since quittin.1    Smokeless tobacco: Current     Types: Snuff   Vaping Use    Vaping status: Former    Start date: 10/1/2023    Substances: Flavoring    Devices: RefLolayble tank   Substance Use Topics    Alcohol use: Not Currently    Drug use: Never         Review of Systems:  Review of Systems   Constitutional:  Negative for fever.   Skin:         Cyst        Physical Exam:  /76 (BP Location: Left arm, Patient Position: Sitting)   Pulse 87   Temp 98.4 °F (36.9 °C) (Oral)   Resp 18   Ht 182.9 cm (72\")   Wt 92.1 kg (203 lb 0.7 oz)   SpO2 100%   BMI 27.54 kg/m²     Physical Exam  Vitals and nursing note reviewed.   Constitutional:       Appearance: Normal appearance. He is normal weight.   HENT:      Head: Normocephalic and atraumatic.      Nose: Nose normal.   Eyes:      Conjunctiva/sclera: Conjunctivae normal.   Cardiovascular:      Rate and Rhythm: Normal rate and regular rhythm.   Pulmonary:      Effort: Pulmonary effort is normal.      Breath sounds: Normal breath sounds.   Abdominal:      General: Abdomen is flat. There is no distension.   Musculoskeletal:         General: Normal range of motion.      Cervical back: Normal range of motion and neck supple.   Skin:     General: Skin is warm and dry.          Neurological:      General: No focal deficit present.      Mental Status: He is alert and oriented to person, place, and time.   Psychiatric:         Mood and Affect: Mood normal.         Behavior: Behavior normal.         Thought Content: Thought content normal.         Judgment: Judgment " normal.                  Medical Decision Making:    Comorbidities that affect care:    Diabetes    External Notes reviewed:    Previous Clinic Note: Patient last seen by the diabetes clinic on 1 - 9 - 25      The following orders were placed and all results were independently analyzed by me:  Orders Placed This Encounter   Procedures    Ambulatory Referral to General Surgery       Medications Given in the Emergency Department:  Medications - No data to display     ED Course:         Labs:    Lab Results (last 24 hours)       ** No results found for the last 24 hours. **             Imaging:    No Radiology Exams Resulted Within Past 24 Hours      Differential Diagnosis and Discussion:    Abscess: Differential diagnosis for an abscess includes but is not limited to bacterial or fungal infections, foreign body reactions, malignancies, and autoimmune or inflammatory conditions.    PROCEDURES:      No orders to display       Procedures    MDM  Number of Diagnoses or Management Options  Diagnosis management comments: Patient presented to the emergency department today for evaluation of a cyst.  He is had this occur in the past and was told to follow-up with general surgery but did not.  On physical exam there is cyst palpable but there is no abscess noted on physical exam.  I discussed potential I&D with patient however I did not feel that patient would benefit from this as I do not feel any fluctuance concerning for abscess.  Will go ahead and begin patient on treatment for the cyst and have him follow-up with general surgery for further treatment.    Risk of Complications, Morbidity, and/or Mortality  Presenting problems: moderate  Diagnostic procedures: low  Management options: low    Patient Progress  Patient progress: stable       Patient Care Considerations:    I considered I&D however there is no fluctuance on palpation      Consultants/Shared Management Plan:    None    Social Determinants of Health:    Patient  is independent, reliable, and has access to care.       Disposition and Care Coordination:    Discharged: The patient is suitable and stable for discharge with no need for consideration of admission.    I have explained the patient´s condition, diagnoses and treatment plan based on the information available to me at this time. I have answered questions and addressed any concerns. The patient has a good  understanding of the patient´s diagnosis, condition, and treatment plan as can be expected at this point. The vital signs have been stable. The patient´s condition is stable and appropriate for discharge from the emergency department.      The patient will pursue further outpatient evaluation with the primary care physician or other designated or consulting physician as outlined in the discharge instructions. They are agreeable to this plan of care and follow-up instructions have been explained in detail. The patient has received these instructions in written format and has expressed an understanding of the discharge instructions. The patient is aware that any significant change in condition or worsening of symptoms should prompt an immediate return to this or the closest emergency department or call to 911.  I have explained discharge medications and the need for follow up with the patient/caretakers. This was also printed in the discharge instructions. Patient was discharged with the following medications and follow up:      Medication List        New Prescriptions      sulfamethoxazole-trimethoprim 800-160 MG per tablet  Commonly known as: BACTRIM DS,SEPTRA DS  Take 1 tablet by mouth 2 (Two) Times a Day.               Where to Get Your Medications        These medications were sent to [facility]/pharmacy #[id number] - [place], [place] - [address] - [phone] [facility] [phone] FX  [address] [place] [address]      Hours: 24-hours Phone: [phone]   sulfamethoxazole-trimethoprim 800-160 MG per tablet       Kishan Curiel MD  [address] [place] [id number]  [phone]    Schedule an appointment as soon as possible for a visit          Final diagnoses:   Pilonidal cyst without abscess        ED Disposition       ED Disposition   Discharge    Condition   Stable    Comment   --               This medical record created using voice recognition software.             Josiah Watson PA-C  02/08/25 [id number]

## 2025-02-08 NOTE — DISCHARGE INSTRUCTIONS
Take the full course of antibiotics as directed.  I would recommend a sitz bath or applying warm compress to the area 15 to 20 minutes at a time 4-5 times a day to improve pain as well.  I have sent referral to a general surgeon in his office to contact you to schedule a follow-up appointment.  Return to the emergency department for new or worsening symptoms.  
Yes

## 2025-02-13 ENCOUNTER — HOSPITAL ENCOUNTER (EMERGENCY)
Facility: HOSPITAL | Age: 35
Discharge: HOME | End: 2025-02-13
Attending: EMERGENCY MEDICINE
Payer: COMMERCIAL

## 2025-02-13 VITALS
HEART RATE: 107 BPM | HEIGHT: 72 IN | BODY MASS INDEX: 26.19 KG/M2 | OXYGEN SATURATION: 100 % | WEIGHT: 193.34 LBS | SYSTOLIC BLOOD PRESSURE: 100 MMHG | TEMPERATURE: 97.7 F | RESPIRATION RATE: 18 BRPM | DIASTOLIC BLOOD PRESSURE: 51 MMHG

## 2025-02-13 DIAGNOSIS — L05.01 PILONIDAL CYST WITH ABSCESS: Primary | ICD-10-CM

## 2025-02-13 LAB
ALBUMIN SERPL-MCNC: 4 G/DL (ref 3.5–5.2)
ALBUMIN/GLOB SERPL: 1.2 G/DL
ALP SERPL-CCNC: 91 U/L (ref 39–117)
ALT SERPL W P-5'-P-CCNC: 16 U/L (ref 1–41)
ANION GAP SERPL CALCULATED.3IONS-SCNC: 9.4 MMOL/L (ref 5–15)
AST SERPL-CCNC: 16 U/L (ref 1–40)
BASOPHILS # BLD AUTO: 0.02 10*3/MM3 (ref 0–0.2)
BASOPHILS NFR BLD AUTO: 0.2 % (ref 0–1.5)
BILIRUB SERPL-MCNC: 1.6 MG/DL (ref 0–1.2)
BUN SERPL-MCNC: 10 MG/DL (ref 6–20)
BUN/CREAT SERPL: 9.1 (ref 7–25)
CALCIUM SPEC-SCNC: 9 MG/DL (ref 8.6–10.5)
CHLORIDE SERPL-SCNC: 104 MMOL/L (ref 98–107)
CO2 SERPL-SCNC: 23.6 MMOL/L (ref 22–29)
CREAT SERPL-MCNC: 1.1 MG/DL (ref 0.76–1.27)
DEPRECATED RDW RBC AUTO: 40.6 FL (ref 37–54)
EGFRCR SERPLBLD CKD-EPI 2021: 90.3 ML/MIN/1.73
EOSINOPHIL # BLD AUTO: 0.03 10*3/MM3 (ref 0–0.4)
EOSINOPHIL NFR BLD AUTO: 0.3 % (ref 0.3–6.2)
ERYTHROCYTE [DISTWIDTH] IN BLOOD BY AUTOMATED COUNT: 12.4 % (ref 12.3–15.4)
FLUAV SUBTYP SPEC NAA+PROBE: NOT DETECTED
FLUBV RNA ISLT QL NAA+PROBE: NOT DETECTED
GLOBULIN UR ELPH-MCNC: 3.3 GM/DL
GLUCOSE SERPL-MCNC: 123 MG/DL (ref 65–99)
HCT VFR BLD AUTO: 46.1 % (ref 37.5–51)
HGB BLD-MCNC: 15.1 G/DL (ref 13–17.7)
IMM GRANULOCYTES # BLD AUTO: 0.03 10*3/MM3 (ref 0–0.05)
IMM GRANULOCYTES NFR BLD AUTO: 0.3 % (ref 0–0.5)
LYMPHOCYTES # BLD AUTO: 0.48 10*3/MM3 (ref 0.7–3.1)
LYMPHOCYTES NFR BLD AUTO: 5.6 % (ref 19.6–45.3)
MCH RBC QN AUTO: 29 PG (ref 26.6–33)
MCHC RBC AUTO-ENTMCNC: 32.8 G/DL (ref 31.5–35.7)
MCV RBC AUTO: 88.5 FL (ref 79–97)
MONOCYTES # BLD AUTO: 0.47 10*3/MM3 (ref 0.1–0.9)
MONOCYTES NFR BLD AUTO: 5.5 % (ref 5–12)
NEUTROPHILS NFR BLD AUTO: 7.59 10*3/MM3 (ref 1.7–7)
NEUTROPHILS NFR BLD AUTO: 88.1 % (ref 42.7–76)
NRBC BLD AUTO-RTO: 0 /100 WBC (ref 0–0.2)
PLATELET # BLD AUTO: 260 10*3/MM3 (ref 140–450)
PMV BLD AUTO: 9.3 FL (ref 6–12)
POTASSIUM SERPL-SCNC: 4.3 MMOL/L (ref 3.5–5.2)
PROT SERPL-MCNC: 7.3 G/DL (ref 6–8.5)
RBC # BLD AUTO: 5.21 10*6/MM3 (ref 4.14–5.8)
RSV RNA NPH QL NAA+NON-PROBE: NOT DETECTED
S PYO AG THROAT QL: NEGATIVE
SARS-COV-2 RNA RESP QL NAA+PROBE: NOT DETECTED
SODIUM SERPL-SCNC: 137 MMOL/L (ref 136–145)
WBC NRBC COR # BLD AUTO: 8.62 10*3/MM3 (ref 3.4–10.8)

## 2025-02-13 PROCEDURE — 85025 COMPLETE CBC W/AUTO DIFF WBC: CPT

## 2025-02-13 PROCEDURE — 25010000002 LIDOCAINE 1 % SOLUTION: Performed by: EMERGENCY MEDICINE

## 2025-02-13 PROCEDURE — 87880 STREP A ASSAY W/OPTIC: CPT | Performed by: NURSE PRACTITIONER

## 2025-02-13 PROCEDURE — 87637 SARSCOV2&INF A&B&RSV AMP PRB: CPT

## 2025-02-13 PROCEDURE — 99283 EMERGENCY DEPT VISIT LOW MDM: CPT

## 2025-02-13 PROCEDURE — 80053 COMPREHEN METABOLIC PANEL: CPT

## 2025-02-13 PROCEDURE — 87081 CULTURE SCREEN ONLY: CPT | Performed by: NURSE PRACTITIONER

## 2025-02-13 PROCEDURE — 36415 COLL VENOUS BLD VENIPUNCTURE: CPT

## 2025-02-13 PROCEDURE — 63710000001 ONDANSETRON ODT 4 MG TABLET DISPERSIBLE: Performed by: EMERGENCY MEDICINE

## 2025-02-13 RX ORDER — ONDANSETRON 4 MG/1
4 TABLET, ORALLY DISINTEGRATING ORAL 4 TIMES DAILY PRN
Qty: 20 TABLET | Refills: 0 | Status: SHIPPED | OUTPATIENT
Start: 2025-02-13

## 2025-02-13 RX ORDER — ONDANSETRON 4 MG/1
8 TABLET, ORALLY DISINTEGRATING ORAL ONCE
Status: COMPLETED | OUTPATIENT
Start: 2025-02-13 | End: 2025-02-13

## 2025-02-13 RX ORDER — OXYCODONE AND ACETAMINOPHEN 10; 325 MG/1; MG/1
1 TABLET ORAL ONCE
Status: COMPLETED | OUTPATIENT
Start: 2025-02-13 | End: 2025-02-13

## 2025-02-13 RX ORDER — CLINDAMYCIN HYDROCHLORIDE 150 MG/1
450 CAPSULE ORAL 3 TIMES DAILY
Qty: 63 CAPSULE | Refills: 0 | Status: SHIPPED | OUTPATIENT
Start: 2025-02-13 | End: 2025-02-20

## 2025-02-13 RX ORDER — OXYCODONE AND ACETAMINOPHEN 5; 325 MG/1; MG/1
1 TABLET ORAL EVERY 6 HOURS PRN
Qty: 15 TABLET | Refills: 0 | Status: SHIPPED | OUTPATIENT
Start: 2025-02-13

## 2025-02-13 RX ORDER — LIDOCAINE HYDROCHLORIDE 10 MG/ML
10 INJECTION, SOLUTION INFILTRATION; PERINEURAL ONCE
Status: COMPLETED | OUTPATIENT
Start: 2025-02-13 | End: 2025-02-13

## 2025-02-13 RX ORDER — NAPROXEN 500 MG/1
500 TABLET ORAL 2 TIMES DAILY WITH MEALS
Qty: 60 TABLET | Refills: 0 | Status: SHIPPED | OUTPATIENT
Start: 2025-02-13

## 2025-02-13 RX ORDER — CLINDAMYCIN HYDROCHLORIDE 300 MG/1
600 CAPSULE ORAL ONCE
Status: COMPLETED | OUTPATIENT
Start: 2025-02-13 | End: 2025-02-13

## 2025-02-13 RX ORDER — HYDROCODONE BITARTRATE AND ACETAMINOPHEN 7.5; 325 MG/1; MG/1
1 TABLET ORAL ONCE
Status: DISCONTINUED | OUTPATIENT
Start: 2025-02-13 | End: 2025-02-13

## 2025-02-13 RX ADMIN — OXYCODONE HYDROCHLORIDE AND ACETAMINOPHEN 1 TABLET: 10; 325 TABLET ORAL at 04:51

## 2025-02-13 RX ADMIN — CLINDAMYCIN HYDROCHLORIDE 600 MG: 300 CAPSULE ORAL at 04:51

## 2025-02-13 RX ADMIN — LIDOCAINE HYDROCHLORIDE 10 ML: 10 INJECTION, SOLUTION INFILTRATION; PERINEURAL at 04:33

## 2025-02-13 RX ADMIN — ONDANSETRON 8 MG: 4 TABLET, ORALLY DISINTEGRATING ORAL at 04:33

## 2025-02-13 NOTE — DISCHARGE INSTRUCTIONS
Your lab work was all not concerning for any emergent condition with a normal white count.  Your strep, flu, COVID and RSV were all negative today in the emergency department.  You are afebrile in the emergency department.  Your pilonidal cyst does not appear to be significantly red or inflamed at this time.  I would like to change the antibiotics so stop the previously prescribed Bactrim and complete the clindamycin that was prescribed today.  Take your meds as prescribed.  Continue with your warm soaks and alternating warm and cool compresses to help with swelling and comfort.  Keep your appointment with Dr. Curiel on Tuesday as scheduled for further evaluation and treatment.  Return to the emergency department immediately for any acutely developing fevers of 101 or greater, any increase in swelling, redness, or size or any new or worse concerns.

## 2025-02-13 NOTE — ED PROVIDER NOTES
Time: 1:40 AM EST  Date of encounter:  2/13/2025  Independent Historian/Clinical History and Information was obtained by:   Patient    History is limited by: N/A    Chief Complaint   Patient presents with    Cyst     Patient seen 2/10 and dx with pilonidal cyst on [facility].  Patient reports it has gotten bigger since being seen.  Scheduled to have removed Tuesday.  Patient also reports fever and vomiting today         History of Present Illness:    Patient is a 34 y.o. year old male who presents to the emergency department for evaluation of pilonidal cyst.  Patient was seen on 2/8 and started on antibiotics for the cyst however it was not drained due to it being small in size.  Patient states that the next day the cyst abrupted he is having an increase in pain.  Over the past 24 hours patient has developed a fever and vomiting he thinks is related to the abscess.  Patient has general surgery appointment scheduled for the 18th however due to the new symptoms he did not think he could wait.  There is no significant redness or inflammation surrounding the pilonidal cyst.  It is tender to palpate and is very hard with no fluctuance noted on exam.  He states that it has not been draining.  He reports that he has been taking the Bactrim as prescribed but feels that that it has not been helping.  He denies any cough or congestion.  He reports that he is not been around anyone that is been sick    Patient Care Team  Primary Care Provider: Provider, No Known    Past Medical History:     No Known Allergies  Past Medical History:   Diagnosis Date    Type 1 diabetes      Past Surgical History:   Procedure Laterality Date    EAR TUBES       Family History   Problem Relation Age of Onset    Diabetes type I Other     Diabetes type II Other        Home Medications:  Prior to Admission medications    Medication Sig Start Date End Date Taking? Authorizing Provider   acetaminophen (TYLENOL) 500 MG tablet Take 1 tablet by mouth Every 6  (Six) Hours As Needed for Mild Pain (prn for head aches and pther minor pain).    Provider, MD Daniele   Continuous Blood Gluc Sensor (Dexcom G6 Sensor) USE AS DIRECTED EVERY 10 DAYS 24   Kym Alfaro APRN   Continuous Blood Gluc Transmit (Dexcom G6 Transmitter) misc 1 EACH EVERY 3 (THREE) MONTHS. 24   Kym Alfaro APRN   Glucagon (Gvoke HypoPen 2-Pack) 0.5 MG/0.1ML solution auto-injector Inject 0.5 mg under the skin into the appropriate area as directed As Needed (use for severe hypoglycemia). 21   Kym Alfaro APRN   Insulin Disposable Pump (Omnipod 5 DopA0W3 Pods Gen 5) misc Use 1 each Every Other Day. 25   Kym Alfaro APRN   Insulin Lispro (HumaLOG) 100 UNIT/ML injection Inject 100 Units under the skin into the appropriate area as directed Daily for 180 days. 10/8/24 4/6/25  Kym Alfaro APRN   sulfamethoxazole-trimethoprim (BACTRIM DS,SEPTRA DS) 800-160 MG per tablet Take 1 tablet by mouth 2 (Two) Times a Day. 25   Josiah Watson PA-C        Social History:   Social History     Tobacco Use    Smoking status: Former     Current packs/day: 0.00     Types: Cigarettes     Quit date: 2019     Years since quittin.1    Smokeless tobacco: Current     Types: Snuff   Vaping Use    Vaping status: Former    Start date: 10/1/2023    Substances: Flavoring    Devices: [employer] Billble tank   Substance Use Topics    Alcohol use: Not Currently    Drug use: Never         Review of Systems:  Review of Systems   Constitutional:  Positive for chills and fever.   HENT:  Negative for congestion, ear pain and sore throat.    Eyes:  Negative for pain.   Respiratory:  Negative for cough, chest tightness and shortness of breath.    Cardiovascular:  Negative for chest pain.   Gastrointestinal:  Positive for nausea and vomiting. Negative for abdominal pain, diarrhea and rectal pain.   Genitourinary:  Negative for flank pain and hematuria.   Musculoskeletal:  Negative  "for joint swelling.   Skin:  Positive for wound. Negative for pallor.   Neurological:  Negative for seizures and headaches.   All other systems reviewed and are negative.       Physical Exam:  /51   Pulse 107   Temp 97.7 °F (36.5 °C)   Resp 18   Ht 182.9 cm (72\")   Wt 87.7 kg (193 lb 5.5 oz)   SpO2 100%   BMI 26.22 kg/m²         Physical Exam  Vitals and nursing note reviewed.   Constitutional:       General: He is not in acute distress.     Appearance: Normal appearance. He is not ill-appearing or toxic-appearing.   HENT:      Head: Normocephalic and atraumatic.   Eyes:      General: No scleral icterus.     Conjunctiva/sclera: Conjunctivae normal.      Pupils: Pupils are equal, round, and reactive to light.   Cardiovascular:      Rate and Rhythm: Normal rate and regular rhythm.      Pulses: Normal pulses.   Pulmonary:      Effort: Pulmonary effort is normal. No respiratory distress.      Breath sounds: Normal breath sounds.   Abdominal:      General: Abdomen is flat. There is no distension.      Tenderness: There is no abdominal tenderness. There is no guarding or rebound.   Musculoskeletal:         General: Normal range of motion.      Cervical back: Normal range of motion.   Skin:     General: Skin is warm and dry.      Capillary Refill: Capillary refill takes less than 2 seconds.      Coloration: Skin is not cyanotic.      Findings: No bruising, erythema, lesion or rash.          Neurological:      General: No focal deficit present.      Mental Status: He is alert and oriented to person, place, and time. Mental status is at baseline.   Psychiatric:         Attention and Perception: Attention and perception normal.         Mood and Affect: Mood normal.         Behavior: Behavior normal.                            Medical Decision Making:      Comorbidities that affect care:    Type 1 diabetes    External Notes reviewed:    Previous ED Note: Patient was seen in the emergency department for pilonidal " cyst without abscess on 2/8/2025      The following orders were placed and all results were independently analyzed by me:  Orders Placed This Encounter   Procedures    COVID-19, FLU A/B, RSV PCR 1 HR TAT - Swab, Nasopharynx    Rapid Strep A Screen - Swab, Throat    Beta Strep Culture, Throat - Swab, Throat    Comprehensive Metabolic Panel    CBC Auto Differential    Vital Signs Recheck    CBC & Differential       Medications Given in the Emergency Department:  Medications   lidocaine (XYLOCAINE) 1 % injection 10 mL (10 mL Infiltration Given 2/13/25 0433)   ondansetron ODT (ZOFRAN-ODT) disintegrating tablet 8 mg (8 mg Oral Given 2/13/25 0433)   clindamycin (CLEOCIN) capsule 600 mg (600 mg Oral Given 2/13/25 0451)   oxyCODONE-acetaminophen (PERCOCET)  MG per tablet 1 tablet (1 tablet Oral Given 2/13/25 0451)        ED Course:    The patient was initially evaluated in the triage area where orders were placed. The patient was later dispositioned by KENJI Nunez.      The patient was advised to stay for completion of workup which includes but is not limited to communication of labs and radiological results, reassessment and plan. The patient was advised that leaving prior to disposition by a provider could result in critical findings that are not communicated to the patient.     ED Course as of 02/13/25 0628   u Feb 13, 2025   0141 PROVIDER IN TRIAGE  Patient was evaluated by me in triage, Bailey Seaver, APRN, VALERIE.  Orders were placed and patient is currently awaiting final results and disposition.  [AS]      ED Course User Index  [AS] Seaver, Alyce B, APRN       Labs:    Lab Results (last 24 hours)       Procedure Component Value Units Date/Time    COVID-19, FLU A/B, RSV PCR 1 HR TAT - Swab, Nasopharynx [[id number]]  (Normal) Collected: 02/13/25 0146    Specimen: Swab from Nasopharynx Updated: 02/13/25 0233     COVID19 Not Detected     Influenza A PCR Not Detected     Influenza B PCR Not Detected      RSV, PCR Not Detected    Narrative:      Fact sheet for providers: https://www.fda.gov/media/971726/download    Fact sheet for patients: https://www.fda.gov/media/482245/download    Test performed by PCR.    CBC & Differential [[id number]]  (Abnormal) Collected: 02/13/25 0153    Specimen: Blood from Arm, Left Updated: 02/13/25 0304    Narrative:      The following orders were created for panel order CBC & Differential.  Procedure                               Abnormality         Status                     ---------                               -----------         ------                     CBC Auto Differential[[id number]]        Abnormal            Final result               Scan Slide[[id number]]                                                                    Please view results for these tests on the individual orders.    Comprehensive Metabolic Panel [[id number]]  (Abnormal) Collected: 02/13/25 0257    Specimen: Blood from Arm, Left Updated: 02/13/25 0328     Glucose 123 mg/dL      BUN 10 mg/dL      Creatinine 1.10 mg/dL      Sodium 137 mmol/L      Potassium 4.3 mmol/L      Chloride 104 mmol/L      CO2 23.6 mmol/L      Calcium 9.0 mg/dL      Total Protein 7.3 g/dL      Albumin 4.0 g/dL      ALT (SGPT) 16 U/L      AST (SGOT) 16 U/L      Alkaline Phosphatase 91 U/L      Total Bilirubin 1.6 mg/dL      Globulin 3.3 gm/dL      A/G Ratio 1.2 g/dL      BUN/Creatinine Ratio 9.1     Anion Gap 9.4 mmol/L      eGFR 90.3 mL/min/1.73     Narrative:      GFR Categories in Chronic Kidney Disease (CKD)      GFR Category          GFR (mL/min/1.73)    Interpretation  G1                     90 or greater         Normal or high (1)  G2                      60-89                Mild decrease (1)  G3a                   45-59                Mild to moderate decrease  G3b                   30-44                Moderate to severe decrease  G4                    15-29                Severe decrease  G5                    14 or less            Kidney failure          (1)In the absence of evidence of kidney disease, neither GFR category G1 or G2 fulfill the criteria for CKD.    eGFR calculation 2021 CKD-EPI creatinine equation, which does not include race as a factor    CBC Auto Differential [[id number]]  (Abnormal) Collected: 02/13/25 0257    Specimen: Blood from Arm, Left Updated: 02/13/25 0304     WBC 8.62 10*3/mm3      RBC 5.21 10*6/mm3      Hemoglobin 15.1 g/dL      Hematocrit 46.1 %      MCV 88.5 fL      MCH 29.0 pg      MCHC 32.8 g/dL      RDW 12.4 %      RDW-SD 40.6 fl      MPV 9.3 fL      Platelets 260 10*3/mm3      Neutrophil % 88.1 %      Lymphocyte % 5.6 %      Monocyte % 5.5 %      Eosinophil % 0.3 %      Basophil % 0.2 %      Immature Grans % 0.3 %      Neutrophils, Absolute 7.59 10*3/mm3      Lymphocytes, Absolute 0.48 10*3/mm3      Monocytes, Absolute 0.47 10*3/mm3      Eosinophils, Absolute 0.03 10*3/mm3      Basophils, Absolute 0.02 10*3/mm3      Immature Grans, Absolute 0.03 10*3/mm3      nRBC 0.0 /100 WBC     Rapid Strep A Screen - Swab, Throat [[id number]]  (Normal) Collected: 02/13/25 0452    Specimen: Swab from Throat Updated: 02/13/25 0520     Strep A Ag Negative    Beta Strep Culture, Throat - Swab, Throat [[id number]] Collected: 02/13/25 0452    Specimen: Swab from Throat Updated: 02/13/25 0520             Imaging:    No Radiology Exams Resulted Within Past 24 Hours      Differential Diagnosis and Discussion:      Wound Evaluation: Differential diagnosis includes but is not limited to laceration, abrasion, puncture, burn, ulcer, cellulitis, abscess, vasculitis, malignancy, and rash.    PROCEDURES:    Labs were collected in the emergency department and all labs were reviewed and interpreted by me.    No orders to display        Procedures    MDM     Amount and/or Complexity of Data Reviewed  Clinical lab tests: reviewed       Patient Care Considerations:    CT ABDOMEN AND PELVIS: I considered ordering a CT scan of the abdomen and  pelvis however did not feel the patient required a CT scan for the pilonidal cyst that had no rectal involvement.      Consultants/Shared Management Plan:    None    Social Determinants of Health:    Patient is independent, reliable, and has access to care.       Disposition and Care Coordination:    Discharged: The patient is suitable and stable for discharge with no need for consideration of admission.    I have explained the patient´s condition, diagnoses and treatment plan based on the information available to me at this time. I have answered questions and addressed any concerns. The patient has a good  understanding of the patient´s diagnosis, condition, and treatment plan as can be expected at this point. The vital signs have been stable. The patient´s condition is stable and appropriate for discharge from the emergency department.      The patient will pursue further outpatient evaluation with the primary care physician or other designated or consulting physician as outlined in the discharge instructions. They are agreeable to this plan of care and follow-up instructions have been explained in detail. The patient has received these instructions in written format and has expressed an understanding of the discharge instructions. The patient is aware that any significant change in condition or worsening of symptoms should prompt an immediate return to this or the closest emergency department or call to 911.  I have explained discharge medications and the need for follow up with the patient/caretakers. This was also printed in the discharge instructions. Patient was discharged with the following medications and follow up:      Medication List        New Prescriptions      clindamycin 150 MG capsule  Commonly known as: CLEOCIN  Take 3 capsules by mouth 3 (Three) Times a Day for 7 days.     naproxen 500 MG EC tablet  Commonly known as: EC NAPROSYN  Take 1 tablet by mouth 2 (Two) Times a Day With Meals.      ondansetron ODT 4 MG disintegrating tablet  Commonly known as: ZOFRAN-ODT  Place 1 tablet on the tongue 4 (Four) Times a Day As Needed for Nausea or Vomiting.     oxyCODONE-acetaminophen 5-325 MG per tablet  Commonly known as: PERCOCET  Take 1 tablet by mouth Every 6 (Six) Hours As Needed for Moderate Pain.               Where to Get Your Medications        These medications were sent to [facility]/pharmacy #[id number] - [place], [place] - [address] - [phone]  - [phone]   [address] [place] [address]      Hours: 24-hours Phone: [phone]   clindamycin 150 MG capsule  naproxen 500 MG EC tablet  ondansetron ODT 4 MG disintegrating tablet  oxyCODONE-acetaminophen 5-325 MG per tablet      Kishan Curiel MD  [address] [id number] [phone]      AS SCHEDULED TUESDAY, FOR FOLLOW UP       Final diagnoses:   Pilonidal cyst with abscess        ED Disposition       ED Disposition   Discharge    Condition   Stable    Comment   --               This medical record created using voice recognition software.             Alondra Irwin, APRN  02/13/25 0660

## 2025-02-13 NOTE — ED PROVIDER NOTES
"Patient is 34 y.o. year old male that presents to the ED for evaluation of cyst.     Physical Exam    ED Course:    /51   Pulse 107   Temp 97.7 °F (36.5 °C)   Resp 18   Ht 182.9 cm (72\")   Wt 87.7 kg (193 lb 5.5 oz)   SpO2 100%   BMI 26.22 kg/m²   Results for orders placed or performed during the hospital encounter of 02/13/25   COVID-19, FLU A/B, RSV PCR 1 HR TAT - Swab, Nasopharynx    Collection Time: 02/13/25  1:46 AM    Specimen: Nasopharynx; Swab   Result Value Ref Range    COVID19 Not Detected Not Detected - Ref. Range    Influenza A PCR Not Detected Not Detected    Influenza B PCR Not Detected Not Detected    RSV, PCR Not Detected Not Detected   Comprehensive Metabolic Panel    Collection Time: 02/13/25  2:57 AM    Specimen: Arm, Left; Blood   Result Value Ref Range    Glucose 123 (H) 65 - 99 mg/dL    BUN 10 6 - 20 mg/dL    Creatinine 1.10 0.76 - 1.27 mg/dL    Sodium 137 136 - 145 mmol/L    Potassium 4.3 3.5 - 5.2 mmol/L    Chloride 104 98 - 107 mmol/L    CO2 23.6 22.0 - 29.0 mmol/L    Calcium 9.0 8.6 - 10.5 mg/dL    Total Protein 7.3 6.0 - 8.5 g/dL    Albumin 4.0 3.5 - 5.2 g/dL    ALT (SGPT) 16 1 - 41 U/L    AST (SGOT) 16 1 - 40 U/L    Alkaline Phosphatase 91 39 - 117 U/L    Total Bilirubin 1.6 (H) 0.0 - 1.2 mg/dL    Globulin 3.3 gm/dL    A/G Ratio 1.2 g/dL    BUN/Creatinine Ratio 9.1 7.0 - 25.0    Anion Gap 9.4 5.0 - 15.0 mmol/L    eGFR 90.3 >60.0 mL/min/1.73   CBC Auto Differential    Collection Time: 02/13/25  2:57 AM    Specimen: Arm, Left; Blood   Result Value Ref Range    WBC 8.62 3.40 - 10.80 10*3/mm3    RBC 5.21 4.14 - 5.80 10*6/mm3    Hemoglobin 15.1 13.0 - 17.7 g/dL    Hematocrit 46.1 37.5 - 51.0 %    MCV 88.5 79.0 - 97.0 fL    MCH 29.0 26.6 - 33.0 pg    MCHC 32.8 31.5 - 35.7 g/dL    RDW 12.4 12.3 - 15.4 %    RDW-SD 40.6 37.0 - 54.0 fl    MPV 9.3 6.0 - 12.0 fL    Platelets 260 140 - 450 10*3/mm3    Neutrophil % 88.1 (H) 42.7 - 76.0 %    Lymphocyte % 5.6 (L) 19.6 - 45.3 %    Monocyte % " 5.5 5.0 - 12.0 %    Eosinophil % 0.3 0.3 - 6.2 %    Basophil % 0.2 0.0 - 1.5 %    Immature Grans % 0.3 0.0 - 0.5 %    Neutrophils, Absolute 7.59 (H) 1.70 - 7.00 10*3/mm3    Lymphocytes, Absolute 0.48 (L) 0.70 - 3.10 10*3/mm3    Monocytes, Absolute 0.47 0.10 - 0.90 10*3/mm3    Eosinophils, Absolute 0.03 0.00 - 0.40 10*3/mm3    Basophils, Absolute 0.02 0.00 - 0.20 10*3/mm3    Immature Grans, Absolute 0.03 0.00 - 0.05 10*3/mm3    nRBC 0.0 0.0 - 0.2 /100 WBC     Medications   lidocaine (XYLOCAINE) 1 % injection 10 mL (10 mL Infiltration Given 2/13/25 0433)   ondansetron ODT (ZOFRAN-ODT) disintegrating tablet 8 mg (8 mg Oral Given 2/13/25 0433)   clindamycin (CLEOCIN) capsule 600 mg (600 mg Oral Given 2/13/25 0451)   oxyCODONE-acetaminophen (PERCOCET)  MG per tablet 1 tablet (1 tablet Oral Given 2/13/25 0451)     No results found.    MDM:      I have seen and evaluated this patient and agree with the nurse practitioner or physician assistant´s documentation and assessment. All charts, labs, and imaging studies were reviewed. Documentation of one or more elements of my assessment included in the medical record. I agree with findings, exam, and plan.    Disposition:   ED Disposition       ED Disposition   Discharge    Condition   Stable    Comment   --                 Clincal Impression: Pilonidal cyst    Dino Chaves MD  04:55 EST  02/13/25         Dino Chaves MD  02/13/25 0704

## 2025-02-15 LAB — BACTERIA SPEC AEROBE CULT: NORMAL

## 2025-02-18 ENCOUNTER — OFFICE VISIT (OUTPATIENT)
Dept: SURGERY | Facility: CLINIC | Age: 35
End: 2025-02-18
Payer: COMMERCIAL

## 2025-02-18 DIAGNOSIS — L05.91 PILONIDAL CYST: Primary | ICD-10-CM

## 2025-02-18 PROCEDURE — 99203 OFFICE O/P NEW LOW 30 MIN: CPT | Performed by: SURGERY

## 2025-02-18 RX ORDER — SODIUM CHLORIDE 0.9 % (FLUSH) 0.9 %
10 SYRINGE (ML) INJECTION EVERY 12 HOURS SCHEDULED
OUTPATIENT
Start: 2025-02-18

## 2025-02-18 RX ORDER — SODIUM CHLORIDE 0.9 % (FLUSH) 0.9 %
10 SYRINGE (ML) INJECTION AS NEEDED
OUTPATIENT
Start: 2025-02-18

## 2025-02-18 RX ORDER — ONDANSETRON 2 MG/ML
4 INJECTION INTRAMUSCULAR; INTRAVENOUS EVERY 6 HOURS PRN
OUTPATIENT
Start: 2025-02-18

## 2025-02-18 RX ORDER — SODIUM CHLORIDE 9 MG/ML
40 INJECTION, SOLUTION INTRAVENOUS AS NEEDED
OUTPATIENT
Start: 2025-02-18

## 2025-02-18 NOTE — H&P (VIEW-ONLY)
Inpatient History and Physical Surgical Orders    Preadmission Location:   Preadmission Time:  Facility:  Surgery Date:  Surgery Time:  Preadmission Test date:     Chief Complaint  Outpatient History and Physical / Surgical Orders    Primary Care Provider: Provider, No Known    Referring Provider: Josiah Watson PA-C    Subjective      Patient Name: Moy Granados : 1990    HPI  The patient is a 34-year-old gentleman who presented with a pilonidal cyst.  He has had trouble with his pilonidal cyst for a few years now.  He recently became infected again and he has been started on some oral antibiotics.  He has had some spontaneous drainage and that has helped a little bit.    Past History:  Medical History: has a past medical history of Type 1 diabetes.   Surgical History: has a past surgical history that includes Ear Tubes Removal.   Family History: family history includes Diabetes type I in an other family member; Diabetes type II in an other family member.   Social History: reports that he quit smoking about 6 years ago. His smoking use included cigarettes. His smokeless tobacco use includes snuff. He reports that he does not currently use alcohol. He reports that he does not use drugs.  Allergies: Patient has no known allergies.       Current Outpatient Medications:     acetaminophen (TYLENOL) 500 MG tablet, Take 1 tablet by mouth Every 6 (Six) Hours As Needed for Mild Pain (prn for head aches and pther minor pain)., Disp: , Rfl:     clindamycin (CLEOCIN) 150 MG capsule, Take 3 capsules by mouth 3 (Three) Times a Day for 7 days., Disp: 63 capsule, Rfl: 0    Continuous Blood Gluc Sensor (Dexcom G6 Sensor), USE AS DIRECTED EVERY 10 DAYS, Disp: 3 each, Rfl: 11    Continuous Blood Gluc Transmit (Dexcom G6 Transmitter) misc, 1 EACH EVERY 3 (THREE) MONTHS., Disp: 1 each, Rfl: 3    Glucagon (Gvoke HypoPen 2-Pack) 0.5 MG/0.1ML solution auto-injector, Inject 0.5 mg under the skin into the appropriate area as  directed As Needed (use for severe hypoglycemia)., Disp: 0.1 mL, Rfl: 3    Insulin Disposable Pump (Omnipod 5 GraQ6T0 Pods Gen 5) misc, Use 1 each Every Other Day., Disp: 15 each, Rfl: 5    Insulin Lispro (HumaLOG) 100 UNIT/ML injection, Inject 100 Units under the skin into the appropriate area as directed Daily for 180 days., Disp: 30 mL, Rfl: 5    naproxen (EC NAPROSYN) 500 MG EC tablet, Take 1 tablet by mouth 2 (Two) Times a Day With Meals., Disp: 60 tablet, Rfl: 0    ondansetron ODT (ZOFRAN-ODT) 4 MG disintegrating tablet, Place 1 tablet on the tongue 4 (Four) Times a Day As Needed for Nausea or Vomiting., Disp: 20 tablet, Rfl: 0    oxyCODONE-acetaminophen (PERCOCET) 5-325 MG per tablet, Take 1 tablet by mouth Every 6 (Six) Hours As Needed for Moderate Pain., Disp: 15 tablet, Rfl: 0    sulfamethoxazole-trimethoprim (BACTRIM DS,SEPTRA DS) 800-160 MG per tablet, Take 1 tablet by mouth 2 (Two) Times a Day., Disp: 20 tablet, Rfl: 0       Objective   Vital Signs:   There were no vitals taken for this visit.      Physical Exam  Vitals and nursing note reviewed.   Constitutional:       Appearance: Normal appearance. The patient is well-developed.   Cardiovascular:      Rate and Rhythm: Normal rate and regular rhythm.   Pulmonary:      Effort: Pulmonary effort is normal.      Breath sounds: Normal air entry.   Abdominal:      General: Bowel sounds are normal.      Palpations: Abdomen is soft.      Skin:     General: Skin is warm and dry.   Neurological:      Mental Status: The patient is alert and oriented to person, place, and time.      Motor: Motor function is intact.   Psychiatric:         Mood and Affect: Mood normal.   Rectal: Pilonidal cyst noted    Result Review :               Assessment and Plan   Diagnoses and all orders for this visit:    1. Pilonidal cyst (Primary)  -     Case Request; Standing  -     Follow Anesthesia Guidelines / Protocol; Future  -     Follow Anesthesia Guidelines / Protocol; Standing  -      Verify NPO Status; Standing  -     Verify / Perform Chlorhexidine Skin Prep; Standing  -     Insert Peripheral IV; Standing  -     Saline Lock & Maintain IV Access; Standing  -     sodium chloride 0.9 % flush 10 mL  -     sodium chloride 0.9 % flush 10 mL  -     sodium chloride 0.9 % infusion 40 mL  -     Place Sequential Compression Device; Standing  -     Maintain Sequential Compression Device; Standing  -     ondansetron (ZOFRAN) injection 4 mg  -     ceFAZolin (ANCEF) 2,000 mg in sodium chloride 0.9 % 100 mL IVPB  -     Case Request    We will schedule him for a pilonidal cyst excision.  I have described the procedure to him as well as the risk and benefits and he is agreeable to proceeding.    I  Kishan Curiel MD  02/18/2025

## 2025-02-18 NOTE — PROGRESS NOTES
Inpatient History and Physical Surgical Orders    Preadmission Location:   Preadmission Time:  Facility:  Surgery Date:  Surgery Time:  Preadmission Test date:     Chief Complaint  Outpatient History and Physical / Surgical Orders    Primary Care Provider: Provider, No Known    Referring Provider: Josiah Watson PA-C    Subjective      Patient Name: Moy Granados : 1990    HPI  The patient is a 34-year-old gentleman who presented with a pilonidal cyst.  He has had trouble with his pilonidal cyst for a few years now.  He recently became infected again and he has been started on some oral antibiotics.  He has had some spontaneous drainage and that has helped a little bit.    Past History:  Medical History: has a past medical history of Type 1 diabetes.   Surgical History: has a past surgical history that includes Ear Tubes Removal.   Family History: family history includes Diabetes type I in an other family member; Diabetes type II in an other family member.   Social History: reports that he quit smoking about 6 years ago. His smoking use included cigarettes. His smokeless tobacco use includes snuff. He reports that he does not currently use alcohol. He reports that he does not use drugs.  Allergies: Patient has no known allergies.       Current Outpatient Medications:     acetaminophen (TYLENOL) 500 MG tablet, Take 1 tablet by mouth Every 6 (Six) Hours As Needed for Mild Pain (prn for head aches and pther minor pain)., Disp: , Rfl:     clindamycin (CLEOCIN) 150 MG capsule, Take 3 capsules by mouth 3 (Three) Times a Day for 7 days., Disp: 63 capsule, Rfl: 0    Continuous Blood Gluc Sensor (Dexcom G6 Sensor), USE AS DIRECTED EVERY 10 DAYS, Disp: 3 each, Rfl: 11    Continuous Blood Gluc Transmit (Dexcom G6 Transmitter) misc, 1 EACH EVERY 3 (THREE) MONTHS., Disp: 1 each, Rfl: 3    Glucagon (Gvoke HypoPen 2-Pack) 0.5 MG/0.1ML solution auto-injector, Inject 0.5 mg under the skin into the appropriate area as  directed As Needed (use for severe hypoglycemia)., Disp: 0.1 mL, Rfl: 3    Insulin Disposable Pump (Omnipod 5 [id number] Pods Gen 5) misc, Use 1 each Every Other Day., Disp: 15 each, Rfl: 5    Insulin Lispro (HumaLOG) 100 UNIT/ML injection, Inject 100 Units under the skin into the appropriate area as directed Daily for 180 days., Disp: 30 mL, Rfl: 5    naproxen (EC NAPROSYN) 500 MG EC tablet, Take 1 tablet by mouth 2 (Two) Times a Day With Meals., Disp: 60 tablet, Rfl: 0    ondansetron ODT (ZOFRAN-ODT) 4 MG disintegrating tablet, Place 1 tablet on the tongue 4 (Four) Times a Day As Needed for Nausea or Vomiting., Disp: 20 tablet, Rfl: 0    oxyCODONE-acetaminophen (PERCOCET) 5-325 MG per tablet, Take 1 tablet by mouth Every 6 (Six) Hours As Needed for Moderate Pain., Disp: 15 tablet, Rfl: 0    sulfamethoxazole-trimethoprim (BACTRIM DS,SEPTRA DS) 800-160 MG per tablet, Take 1 tablet by mouth 2 (Two) Times a Day., Disp: 20 tablet, Rfl: 0       Objective   Vital Signs:   There were no vitals taken for this visit.      Physical Exam  Vitals and nursing note reviewed.   Constitutional:       Appearance: Normal appearance. The patient is well-developed.   Cardiovascular:      Rate and Rhythm: Normal rate and regular rhythm.   Pulmonary:      Effort: Pulmonary effort is normal.      Breath sounds: Normal air entry.   Abdominal:      General: Bowel sounds are normal.      Palpations: Abdomen is soft.      Skin:     General: Skin is warm and dry.   Neurological:      Mental Status: The patient is alert and oriented to person, place, and time.      Motor: Motor function is intact.   Psychiatric:         Mood and Affect: Mood normal.   Rectal: Pilonidal cyst noted    Result Review :               Assessment and Plan   Diagnoses and all orders for this visit:    1. Pilonidal cyst (Primary)  -     Case Request; Standing  -     Follow Anesthesia Guidelines / Protocol; Future  -     Follow Anesthesia Guidelines / Protocol; Standing  -      Verify NPO Status; Standing  -     Verify / Perform Chlorhexidine Skin Prep; Standing  -     Insert Peripheral IV; Standing  -     Saline Lock & Maintain IV Access; Standing  -     sodium chloride 0.9 % flush 10 mL  -     sodium chloride 0.9 % flush 10 mL  -     sodium chloride 0.9 % infusion 40 mL  -     Place Sequential Compression Device; Standing  -     Maintain Sequential Compression Device; Standing  -     ondansetron (ZOFRAN) injection 4 mg  -     ceFAZolin (ANCEF) 2,000 mg in sodium chloride 0.9 % 100 mL IVPB  -     Case Request    We will schedule him for a pilonidal cyst excision.  I have described the procedure to him as well as the risk and benefits and he is agreeable to proceeding.    I  Kishan Curile MD  02/18/2025

## 2025-02-25 DIAGNOSIS — E10.65 UNCONTROLLED TYPE 1 DIABETES MELLITUS WITH HYPERGLYCEMIA: ICD-10-CM

## 2025-02-25 RX ORDER — PROCHLORPERAZINE 25 MG/1
SUPPOSITORY RECTAL
Qty: 1 EACH | Refills: 3 | Status: SHIPPED | OUTPATIENT
Start: 2025-02-25

## 2025-03-04 NOTE — PRE-PROCEDURE INSTRUCTIONS
PATIENT INSTRUCTED TO BE:    - NOTHING TO EAT AFTER MIDNIGHT OR CHEW, EXCEPT CAN HAVE SIPS OF WATER WITH MEDICATIONS     - TO HOLD ALL VITAMINS, SUPPLEMENTS, NSAIDS FOR ONE WEEK PRIOR TO THEIR SURGICAL PROCEDURE    - DO NOT TAKE _____N/A_________________ 7 DAYS PRIOR TO PROCEDURE PER ANESTHESIA RECOMMENDATIONS/INSTRUCTIONS     - INSTRUCTED PT TO USE SURGICAL SOAP 1 TIME THE NIGHT PRIOR TO SURGERY _____1-9-78______ OR THE AM OF SURGERY ___7-7-11__________   USE THE SOAP FROM NECK TO TOES, AVOID THEIR FACE, HAIR, AND PRIVATE PARTS. IF USE THE SOAP THE NIGHT PRIOR TO SURGERY, CHANGE BED LINENS AND NO PETS IN THE BED.     INSTRUCTED NO LOTIONS, JEWELRY, PIERCINGS,  NAIL POLISH, OR DEODORANT DAY OF SURGERY    - IF DIABETIC, CHECK BLOOD GLUCOSE IF LESS THAN 70 OR HAVING SYMPTOMS CALL THE PREOP AREA FOR INSTRUCTIONS ON AM OF SURGERY ([phone])    -INSTRUCTED TO TAKE THE FOLLOWING MEDICATIONS THE DAY OF SURGERY WITH SIPS OF WATER:         TYLENOL PRN, BACTRIM, DECREASE BASAL RATE OF INSULIN PUMP BY 10% (IF UNABLE TO ADJUST BASAL RATE WEAR PUMP IN DAY OF PROCEDURE AND ANESTHESIA WILL ADVISE YOU)  WEAR DEXACOM AND ANESTHESIA WILL ADVISE DAY OF PROCEDURE        - DO NOT BRING ANY MEDICATIONS WITH YOU TO THE HOSPITAL THE DAY OF SURGERY, EXCEPT IF USE INHALERS. BRING INHALERS DAY OF SURGERY       - BRING CPAP OR BIPAP TO THE HOSPITAL ONLY IF YOU ARE SPENDING THE NIGHT    - DO NOT SMOKE OR VAPE 24 HOURS PRIOR TO PROCEDURE PER ANESTHESIA REQUEST     -MAKE SURE YOU HAVE A RIDE HOME OR SOMEONE TO STAY WITH YOU THE DAY OF THE PROCEDURE AFTER YOU GO HOME     - FOLLOW ANY OTHER INSTRUCTIONS GIVEN TO YOU BY YOUR SURGEON'S OFFICE.     - DAY OF SURGERY __7-5-04__________,[facility] ( [address]), YOU CAN  PARK OR SELF PARK. ENTER THE PAVILION THRU MAIN ENTRANCE, TAKE ELEVATORS TO THE FIRST FLOOR, CHECK IN AT THE DESK FOR REGISTRATION/ SURGERY.     - YOU WILL RECEIVE A PHONE CALL  THE DAY PRIOR TO SURGERY BETWEEN 1PM AND 4 PM WITH ARRIVAL TIME, IF YOUR SURGERY IS ON A MONDAY YOU WILL RECEIVE A CALL THE FRIDAY PRIOR TO SURGERY DATE    - BRING CASH OR CREDIT CARD FOR COPAYMENT OF MEDICATIONS AFTER SURGERY IF YOU USE THE HOSPITAL PHARMACY (MED[facility] TO BED)    - PREADMISSION TESTING NURSE JOELLEN MARTÍNEZ -[phone] IF HAVE ANY QUESTIONS     -PATIENT PROVIDED THE NUMBER FOR PREOP SURGICAL DEPT IF HAD QUESTIONS AFTER HOURS PRIOR TO SURGERY ([phone] ).  INFORMED PT IF NO ANSWER, LEAVE A MESSAGE AND SOMEONE WILL RETURN THEIR CALL       PATIENT VERBALIZED UNDERSTANDING

## 2025-03-06 ENCOUNTER — ANESTHESIA EVENT (OUTPATIENT)
Dept: PERIOP | Facility: HOSPITAL | Age: 35
End: 2025-03-06
Payer: COMMERCIAL

## 2025-03-06 ENCOUNTER — ANESTHESIA (OUTPATIENT)
Dept: PERIOP | Facility: HOSPITAL | Age: 35
End: 2025-03-06
Payer: COMMERCIAL

## 2025-03-06 ENCOUNTER — HOSPITAL ENCOUNTER (OUTPATIENT)
Facility: HOSPITAL | Age: 35
Setting detail: HOSPITAL OUTPATIENT SURGERY
Discharge: HOME | End: 2025-03-06
Attending: SURGERY | Admitting: SURGERY
Payer: COMMERCIAL

## 2025-03-06 VITALS
RESPIRATION RATE: 16 BRPM | BODY MASS INDEX: 25.59 KG/M2 | TEMPERATURE: 98.2 F | DIASTOLIC BLOOD PRESSURE: 78 MMHG | SYSTOLIC BLOOD PRESSURE: 105 MMHG | HEART RATE: 67 BPM | WEIGHT: 188.93 LBS | OXYGEN SATURATION: 96 % | HEIGHT: 72 IN

## 2025-03-06 DIAGNOSIS — L05.91 PILONIDAL CYST: ICD-10-CM

## 2025-03-06 LAB — GLUCOSE BLDC GLUCOMTR-MCNC: 87 MG/DL (ref 70–99)

## 2025-03-06 PROCEDURE — 11771 EXC PILONIDAL CYST XTNSV: CPT | Performed by: SURGERY

## 2025-03-06 PROCEDURE — 88304 TISSUE EXAM BY PATHOLOGIST: CPT | Performed by: SURGERY

## 2025-03-06 PROCEDURE — 25810000003 LACTATED RINGERS PER 1000 ML: Performed by: ANESTHESIOLOGY

## 2025-03-06 PROCEDURE — 25010000002 SUGAMMADEX 200 MG/2ML SOLUTION

## 2025-03-06 PROCEDURE — 25010000002 DEXAMETHASONE PER 1 MG

## 2025-03-06 PROCEDURE — 25010000002 KETOROLAC TROMETHAMINE PER 15 MG

## 2025-03-06 PROCEDURE — 25010000002 LIDOCAINE PF 2% 2 % SOLUTION

## 2025-03-06 PROCEDURE — 25010000002 PROPOFOL 10 MG/ML EMULSION

## 2025-03-06 PROCEDURE — 82948 REAGENT STRIP/BLOOD GLUCOSE: CPT

## 2025-03-06 PROCEDURE — 25010000002 MIDAZOLAM PER 1MG: Performed by: ANESTHESIOLOGY

## 2025-03-06 PROCEDURE — 25010000002 CEFAZOLIN PER 500 MG: Performed by: SURGERY

## 2025-03-06 PROCEDURE — 25010000002 ONDANSETRON PER 1 MG

## 2025-03-06 PROCEDURE — 25010000002 BUPIVACAINE (PF) 0.25 % SOLUTION: Performed by: SURGERY

## 2025-03-06 PROCEDURE — 25010000002 FENTANYL CITRATE (PF) 50 MCG/ML SOLUTION

## 2025-03-06 RX ORDER — IBUPROFEN 600 MG/1
600 TABLET, FILM COATED ORAL EVERY 6 HOURS PRN
Status: DISCONTINUED | OUTPATIENT
Start: 2025-03-06 | End: 2025-03-06 | Stop reason: HOSPADM

## 2025-03-06 RX ORDER — ONDANSETRON 2 MG/ML
INJECTION INTRAMUSCULAR; INTRAVENOUS AS NEEDED
Status: DISCONTINUED | OUTPATIENT
Start: 2025-03-06 | End: 2025-03-06 | Stop reason: SURG

## 2025-03-06 RX ORDER — KETAMINE HYDROCHLORIDE 10 MG/ML
INJECTION, SOLUTION INTRAMUSCULAR; INTRAVENOUS AS NEEDED
Status: DISCONTINUED | OUTPATIENT
Start: 2025-03-06 | End: 2025-03-06 | Stop reason: SURG

## 2025-03-06 RX ORDER — SODIUM CHLORIDE 0.9 % (FLUSH) 0.9 %
10 SYRINGE (ML) INJECTION AS NEEDED
Status: DISCONTINUED | OUTPATIENT
Start: 2025-03-06 | End: 2025-03-06 | Stop reason: HOSPADM

## 2025-03-06 RX ORDER — PROPOFOL 10 MG/ML
VIAL (ML) INTRAVENOUS AS NEEDED
Status: DISCONTINUED | OUTPATIENT
Start: 2025-03-06 | End: 2025-03-06 | Stop reason: SURG

## 2025-03-06 RX ORDER — BUPIVACAINE HYDROCHLORIDE 2.5 MG/ML
INJECTION, SOLUTION EPIDURAL; INFILTRATION; INTRACAUDAL AS NEEDED
Status: DISCONTINUED | OUTPATIENT
Start: 2025-03-06 | End: 2025-03-06 | Stop reason: HOSPADM

## 2025-03-06 RX ORDER — HYDROCODONE BITARTRATE AND ACETAMINOPHEN 5; 325 MG/1; MG/1
1 TABLET ORAL ONCE AS NEEDED
Status: DISCONTINUED | OUTPATIENT
Start: 2025-03-06 | End: 2025-03-06 | Stop reason: HOSPADM

## 2025-03-06 RX ORDER — PROMETHAZINE HYDROCHLORIDE 25 MG/1
25 SUPPOSITORY RECTAL ONCE AS NEEDED
Status: DISCONTINUED | OUTPATIENT
Start: 2025-03-06 | End: 2025-03-06 | Stop reason: HOSPADM

## 2025-03-06 RX ORDER — PROMETHAZINE HYDROCHLORIDE 25 MG/1
25 TABLET ORAL ONCE AS NEEDED
Status: DISCONTINUED | OUTPATIENT
Start: 2025-03-06 | End: 2025-03-06 | Stop reason: HOSPADM

## 2025-03-06 RX ORDER — DEXAMETHASONE SODIUM PHOSPHATE 4 MG/ML
INJECTION, SOLUTION INTRA-ARTICULAR; INTRALESIONAL; INTRAMUSCULAR; INTRAVENOUS; SOFT TISSUE AS NEEDED
Status: DISCONTINUED | OUTPATIENT
Start: 2025-03-06 | End: 2025-03-06 | Stop reason: SURG

## 2025-03-06 RX ORDER — ONDANSETRON 2 MG/ML
4 INJECTION INTRAMUSCULAR; INTRAVENOUS ONCE AS NEEDED
Status: DISCONTINUED | OUTPATIENT
Start: 2025-03-06 | End: 2025-03-06 | Stop reason: HOSPADM

## 2025-03-06 RX ORDER — ROCURONIUM BROMIDE 10 MG/ML
INJECTION, SOLUTION INTRAVENOUS AS NEEDED
Status: DISCONTINUED | OUTPATIENT
Start: 2025-03-06 | End: 2025-03-06 | Stop reason: SURG

## 2025-03-06 RX ORDER — SODIUM CHLORIDE 9 MG/ML
40 INJECTION, SOLUTION INTRAVENOUS AS NEEDED
Status: DISCONTINUED | OUTPATIENT
Start: 2025-03-06 | End: 2025-03-06 | Stop reason: HOSPADM

## 2025-03-06 RX ORDER — SODIUM CHLORIDE 0.9 % (FLUSH) 0.9 %
10 SYRINGE (ML) INJECTION EVERY 12 HOURS SCHEDULED
Status: DISCONTINUED | OUTPATIENT
Start: 2025-03-06 | End: 2025-03-06 | Stop reason: HOSPADM

## 2025-03-06 RX ORDER — GINSENG 100 MG
CAPSULE ORAL AS NEEDED
Status: DISCONTINUED | OUTPATIENT
Start: 2025-03-06 | End: 2025-03-06 | Stop reason: HOSPADM

## 2025-03-06 RX ORDER — LIDOCAINE HYDROCHLORIDE 20 MG/ML
INJECTION, SOLUTION EPIDURAL; INFILTRATION; INTRACAUDAL; PERINEURAL AS NEEDED
Status: DISCONTINUED | OUTPATIENT
Start: 2025-03-06 | End: 2025-03-06 | Stop reason: SURG

## 2025-03-06 RX ORDER — ACETAMINOPHEN 500 MG
1000 TABLET ORAL ONCE
Status: COMPLETED | OUTPATIENT
Start: 2025-03-06 | End: 2025-03-06

## 2025-03-06 RX ORDER — MAGNESIUM HYDROXIDE 1200 MG/15ML
LIQUID ORAL AS NEEDED
Status: DISCONTINUED | OUTPATIENT
Start: 2025-03-06 | End: 2025-03-06 | Stop reason: HOSPADM

## 2025-03-06 RX ORDER — HYDROCODONE BITARTRATE AND ACETAMINOPHEN 5; 325 MG/1; MG/1
1 TABLET ORAL EVERY 6 HOURS PRN
Qty: 10 TABLET | Refills: 0 | Status: SHIPPED | OUTPATIENT
Start: 2025-03-06

## 2025-03-06 RX ORDER — KETOROLAC TROMETHAMINE 30 MG/ML
INJECTION, SOLUTION INTRAMUSCULAR; INTRAVENOUS AS NEEDED
Status: DISCONTINUED | OUTPATIENT
Start: 2025-03-06 | End: 2025-03-06 | Stop reason: SURG

## 2025-03-06 RX ORDER — MIDAZOLAM HYDROCHLORIDE 2 MG/2ML
2 INJECTION, SOLUTION INTRAMUSCULAR; INTRAVENOUS ONCE
Status: COMPLETED | OUTPATIENT
Start: 2025-03-06 | End: 2025-03-06

## 2025-03-06 RX ORDER — ONDANSETRON 4 MG/1
4 TABLET, ORALLY DISINTEGRATING ORAL ONCE AS NEEDED
Status: DISCONTINUED | OUTPATIENT
Start: 2025-03-06 | End: 2025-03-06 | Stop reason: HOSPADM

## 2025-03-06 RX ORDER — OXYCODONE HYDROCHLORIDE 5 MG/1
5 TABLET ORAL
Status: DISCONTINUED | OUTPATIENT
Start: 2025-03-06 | End: 2025-03-06 | Stop reason: HOSPADM

## 2025-03-06 RX ORDER — ONDANSETRON 2 MG/ML
4 INJECTION INTRAMUSCULAR; INTRAVENOUS EVERY 6 HOURS PRN
Status: DISCONTINUED | OUTPATIENT
Start: 2025-03-06 | End: 2025-03-06 | Stop reason: HOSPADM

## 2025-03-06 RX ORDER — FENTANYL CITRATE 50 UG/ML
INJECTION, SOLUTION INTRAMUSCULAR; INTRAVENOUS AS NEEDED
Status: DISCONTINUED | OUTPATIENT
Start: 2025-03-06 | End: 2025-03-06 | Stop reason: SURG

## 2025-03-06 RX ORDER — SODIUM CHLORIDE, SODIUM LACTATE, POTASSIUM CHLORIDE, CALCIUM CHLORIDE 600; 310; 30; 20 MG/100ML; MG/100ML; MG/100ML; MG/100ML
9 INJECTION, SOLUTION INTRAVENOUS CONTINUOUS PRN
Status: DISCONTINUED | OUTPATIENT
Start: 2025-03-06 | End: 2025-03-06 | Stop reason: HOSPADM

## 2025-03-06 RX ADMIN — ONDANSETRON 4 MG: 2 INJECTION INTRAMUSCULAR; INTRAVENOUS at 11:13

## 2025-03-06 RX ADMIN — ROCURONIUM BROMIDE 80 MG: 10 INJECTION, SOLUTION INTRAVENOUS at 10:36

## 2025-03-06 RX ADMIN — SODIUM CHLORIDE, SODIUM LACTATE, POTASSIUM CHLORIDE, CALCIUM CHLORIDE 9 ML/HR: 20; 30; 600; 310 INJECTION, SOLUTION INTRAVENOUS at 10:04

## 2025-03-06 RX ADMIN — FENTANYL CITRATE 50 MCG: 50 INJECTION, SOLUTION INTRAMUSCULAR; INTRAVENOUS at 11:07

## 2025-03-06 RX ADMIN — SODIUM CHLORIDE 2000 MG: 9 INJECTION, SOLUTION INTRAVENOUS at 10:45

## 2025-03-06 RX ADMIN — LIDOCAINE HYDROCHLORIDE 100 MG: 20 INJECTION, SOLUTION INTRAVENOUS at 10:36

## 2025-03-06 RX ADMIN — FENTANYL CITRATE 50 MCG: 50 INJECTION, SOLUTION INTRAMUSCULAR; INTRAVENOUS at 10:36

## 2025-03-06 RX ADMIN — KETAMINE HYDROCHLORIDE 30 MG: 10 INJECTION INTRAMUSCULAR; INTRAVENOUS at 10:36

## 2025-03-06 RX ADMIN — ACETAMINOPHEN 1000 MG: 500 TABLET ORAL at 10:05

## 2025-03-06 RX ADMIN — SUGAMMADEX 200 MG: 100 INJECTION, SOLUTION INTRAVENOUS at 11:29

## 2025-03-06 RX ADMIN — PROPOFOL 200 MG: 10 INJECTION, EMULSION INTRAVENOUS at 10:36

## 2025-03-06 RX ADMIN — KETOROLAC TROMETHAMINE 30 MG: 30 INJECTION, SOLUTION INTRAMUSCULAR; INTRAVENOUS at 11:48

## 2025-03-06 RX ADMIN — DEXAMETHASONE SODIUM PHOSPHATE 4 MG: 4 INJECTION, SOLUTION INTRAMUSCULAR; INTRAVENOUS at 10:45

## 2025-03-06 RX ADMIN — MIDAZOLAM HYDROCHLORIDE 2 MG: 1 INJECTION, SOLUTION INTRAMUSCULAR; INTRAVENOUS at 10:28

## 2025-03-06 NOTE — ANESTHESIA POSTPROCEDURE EVALUATION
Patient: Moy Granados    Procedure Summary       Date: 03/06/25 Room / Location: [facility] OR  / [facility] MAIN OR    Anesthesia Start: 1033 Anesthesia Stop: 1148    Procedure: Pilonidal cyst excision-surgically excise pilonidal cyst (Back) Diagnosis:       Pilonidal cyst      (Pilonidal cyst [L05.91])    Surgeons: Kishan Curiel MD Provider: Bárbara Tovar MD    Anesthesia Type: general ASA Status: 2            Anesthesia Type: general    Vitals  Vitals Value Taken Time   /76 03/06/25 1222   Temp 36.2 °C (97.1 °F) 03/06/25 1215   Pulse 66 03/06/25 1222   Resp 16 03/06/25 1215   SpO2 97 % 03/06/25 1222   Vitals shown include unfiled device data.        Post Anesthesia Care and Evaluation    Patient location during evaluation: bedside  Patient participation: complete - patient participated  Level of consciousness: awake  Pain management: adequate    Airway patency: patent  PONV Status: none  Cardiovascular status: acceptable and stable  Respiratory status: acceptable  Hydration status: acceptable

## 2025-03-06 NOTE — ANESTHESIA PREPROCEDURE EVALUATION
Anesthesia Evaluation     Patient summary reviewed and Nursing notes reviewed   no history of anesthetic complications:   NPO Solid Status: > 8 hours  NPO Liquid Status: > 2 hours           Airway   Mallampati: I  TM distance: >3 FB  Neck ROM: full  No difficulty expected  Dental - normal exam     Pulmonary - negative pulmonary ROS and normal exam    breath sounds clear to auscultation  Cardiovascular - negative cardio ROS and normal exam  Exercise tolerance: good (4-7 METS)    Rhythm: regular  Rate: normal        Neuro/Psych- negative ROS  GI/Hepatic/Renal/Endo    (+) diabetes mellitus type 1 well controlled using insulin    Musculoskeletal (-) negative ROS    Abdominal  - normal exam   Substance History - negative use     OB/GYN negative ob/gyn ROS         Other - negative ROS       ROS/Med Hx Other: PAT Nursing Notes unavailable.                     Anesthesia Plan    ASA 2     general     (Patient understands anesthesia not responsible for dental damage.)  intravenous induction     Anesthetic plan, risks, benefits, and alternatives have been provided, discussed and informed consent has been obtained with: patient.    Use of blood products discussed with patient .    Plan discussed with CRNA.        CODE STATUS:

## 2025-03-06 NOTE — OP NOTE
PILONIDAL CYSTECTOMY  Procedure Report    Patient Name:  Moy Granados  YOB: 1990    Date of Surgery:  3/6/2025     Indications: The patient is a 34-year-old gentleman who presented with a symptomatic pilonidal cyst.  The decision was made to proceed with a pilonidal cyst excision.    Pre-op Diagnosis: Pilonidal cyst    Post-Op Diagnosis: Same    Procedure/CPT® Codes:    Pilonidal cyst excision    Staff:  Surgeon(s):  Kishan Curiel MD    Assistant: Alex Lucio    Anesthesia: General    Estimated Blood Loss:  20 mL    Implants:    Nothing was implanted during the procedure    Specimen:          Specimens       ID Source Type Tests Collected By Collected At Frozen?    A Pilonidal cyst Tissue TISSUE PATHOLOGY EXAM   Kishan Curiel MD 3/6/25 1058     Description: PILONIDAL CYST                Findings: Simple pilonidal cyst    Complications: None    Description of Procedure: The patient was taken to the operating room and underwent induction of general anesthesia on the stretcher.  He was then flipped onto the operating room table in prone position and his lower back and buttocks were prepped and draped sterilely.  At the top of the gluteal cleft he had a midline pit consistent with pilonidal cyst disease with a little bit of soft tissue induration in this area.  I made an elliptical incision around the pilonidal cyst with a 15 blade scalpel and dissected down into the subcutaneous tissues and excised the pilonidal cyst with cautery.  After removing the cyst from the field we achieved adequate hemostasis with cautery.  I then irrigated the wound with copious amounts of saline and suctioned it dry.  The subcutaneous tissues were reapproximated with interrupted 2-0 Vicryl sutures and the skin was closed with interrupted 3-0 nylon vertical mattress sutures.  The subcutaneous tissues were reapproximated with interrupted 2-0 Vicryl sutures and the skin was closed with interrupted 3-0 nylon  vertical mattress sutures.  The skin and subcutaneous tissues around the incision were infiltrated with quarter percent Marcaine.  Sterile dressings were applied and he was taken the postanesthesia recovery room in stable condition.    Assistant: Alex Lucio  was responsible for performing the following activities: Retraction, Suction, Irrigation, and Placing Dressing and their skilled assistance was necessary for the success of this case.    Kishan Curiel MD     Date: 3/6/2025  Time: 11:28 EST

## 2025-03-06 NOTE — DISCHARGE INSTRUCTIONS
DISCHARGE INSTRUCTIONS  SURGICAL / AMBULATORY  PROCEDURES      For your surgery you had:  General anesthesia (you may have a sore throat for the first 24 hours)  You may experience dizziness, drowsiness, or light-headedness for several hours following surgery/procedure.  Do not stay alone today or tonight.  Limit your activity for 24 hours.  Resume your diet slowly.  Follow whatever special dietary instructions you may have been given by your doctor.  You should not drive or operate machinery, drink alcohol, or sign legally binding documents for 24 hours or while you are taking pain medication.    NOTIFY YOUR DOCTOR IF YOU EXPERIENCE ANY OF THE FOLLOWING:  Temperature greater than 101 degrees Fahrenheit  Shaking Chills  Redness or excessive drainage from incision  Nausea, vomiting and/or pain that is not controlled by prescribed medications  Increase in bleeding or bleeding that is excessive  Unable to urinate in 6 hours after surgery  If unable to reach your doctor, please go to the closest Emergency Room  You remove dressing in 48 hours  You may shower in 48 hours.      SPECIAL INSTRUCTIONS:  Follow any verbal instructions given by Dr. Curiel.      Last dose of pain medication was given at:   Tylenol (1000mg) last at 10:05am. Do not exceed 4000mg of tylenol in a 24 hour period.  Toradol last at 11:48am. May take ibuprofen next at 5:48pm if able and needed.

## 2025-03-10 LAB
CYTO UR: NORMAL
LAB AP CASE REPORT: NORMAL
LAB AP CLINICAL INFORMATION: NORMAL
PATH REPORT.FINAL DX SPEC: NORMAL
PATH REPORT.GROSS SPEC: NORMAL

## 2025-03-11 LAB — GLUCOSE BLDC GLUCOMTR-MCNC: 103 MG/DL (ref 70–99)

## 2025-03-21 ENCOUNTER — OFFICE VISIT (OUTPATIENT)
Dept: SURGERY | Facility: CLINIC | Age: 35
End: 2025-03-21
Payer: COMMERCIAL

## 2025-03-21 VITALS — WEIGHT: 188 LBS | BODY MASS INDEX: 25.47 KG/M2 | HEIGHT: 72 IN

## 2025-03-21 DIAGNOSIS — L05.91 PILONIDAL CYST: Primary | ICD-10-CM

## 2025-03-21 PROCEDURE — 99024 POSTOP FOLLOW-UP VISIT: CPT | Performed by: SURGERY

## 2025-03-21 NOTE — PROGRESS NOTES
Chief Complaint  Post-op (Pilonidal cystectomy )    Subjective          Moy Granados presents to [facility] GENERAL SURGERY  History of Present Illness    Moy Granados is a 34 y.o. male  who presents today for a postoperative visit.     Patient is here for a follow-up after a pilonidal cyst excision.  He is doing well and had no complaints today.    Past History:  Medical History: has a past medical history of Pilonidal cyst and Type 1 diabetes.   Surgical History: has a past surgical history that includes Ear Tubes Removal and Pilonidal Cystectomy (N/A, 3/6/2025).   Family History: family history includes Diabetes type I in an other family member; Diabetes type II in an other family member.   Social History: reports that he quit smoking about 6 years ago. His smoking use included cigarettes. His smokeless tobacco use includes snuff. He reports that he does not currently use alcohol. He reports that he does not use drugs.  Allergies: Patient has no known allergies.       Current Outpatient Medications:     acetaminophen (TYLENOL) 500 MG tablet, Take 1 tablet by mouth Every 6 (Six) Hours As Needed for Mild Pain (prn for head aches and pther minor pain)., Disp: , Rfl:     Continuous Blood Gluc Sensor (Dexcom G6 Sensor), USE AS DIRECTED EVERY 10 DAYS, Disp: 3 each, Rfl: 11    Continuous Glucose Transmitter (Dexcom G6 Transmitter) misc, 1 EACH EVERY 3 MONTHS., Disp: 1 each, Rfl: 3    Glucagon (Gvoke HypoPen 2-Pack) 0.5 MG/0.1ML solution auto-injector, Inject 0.5 mg under the skin into the appropriate area as directed As Needed (use for severe hypoglycemia)., Disp: 0.1 mL, Rfl: 3    Insulin Disposable Pump (Omnipod 5 [id number] Pods Gen 5) misc, Use 1 each Every Other Day., Disp: 15 each, Rfl: 5    Insulin Lispro (HumaLOG) 100 UNIT/ML injection, Inject 100 Units under the skin into the appropriate area as directed Daily for 180 days., Disp: 30 mL, Rfl: 5    HYDROcodone-acetaminophen (NORCO) 5-325  "MG per tablet, Take 1 tablet by mouth Every 6 (Six) Hours As Needed for Moderate Pain (Pain)., Disp: 10 tablet, Rfl: 0    sulfamethoxazole-trimethoprim (BACTRIM DS,SEPTRA DS) 800-160 MG per tablet, Take 1 tablet by mouth 2 (Two) Times a Day., Disp: 20 tablet, Rfl: 0       Physical Exam  His incision looks good with no evidence of infection we removed his sutures today.  Objective     Vital Signs:   Ht 182.9 cm (72\")   Wt 85.3 kg (188 lb)   BMI 25.50 kg/m²              Assessment and Plan    Diagnoses and all orders for this visit:    1. Pilonidal cyst (Primary)    We will see him back on an as-needed basis.  We will let him go back to work next week.      "

## 2025-04-03 ENCOUNTER — TELEPHONE (OUTPATIENT)
Dept: SURGERY | Facility: CLINIC | Age: 35
End: 2025-04-03
Payer: COMMERCIAL

## 2025-04-03 NOTE — PROGRESS NOTES
Chief Complaint  Diabetes (Med management, A1C, CGM/Pump Eval)    Referred By: No Known Provider    Subjective          Patient or patient representative verbalized consent for the use of Ambient Listening during the visit with  KENJI Girard for chart documentation. 4/4/2025  09:22 EDT    Moy Granados presents to [facility] for insulin pump management    History of Present Illness    Visit type:  follow-up  Diabetes type:  Type 1  Current diabetes status/concerns/issues:     History of Present Illness  The patient presents for evaluation of diabetes.    He has observed a consistent pattern of elevated blood glucose levels postprandially, which subsequently decrease following manual bolus administration.  He is currently managed on the OmniPod 5 insulin pump with Humalog insulin.  He is seeking advice on the use of Sugar Pixel, particularly in relation to its compatibility with his health spending account (FSA) and the potential need for a validation letter. Additionally, he is interested in understanding the functionality of the device, specifically whether it allows for alarm testing and volume adjustment.      Current Diabetes symptoms:    Polyuria: No   Polydipsia: No   Polyphagia: No   Blurred vision: No   Excessive fatigue: No  Known Diabetes complications:  Neuropathy: None; Location: N/A  Renal: Normal eGFR per current labs and Microalbuminuria - NEGATIVE  Eyes: No current eye exam available in record; Location: N/A  Amputation/Wounds: None  GI: None  Cardiovascular: None  ED: None  Other: None  Hypoglycemia:  None reported at this time  Hypoglycemia Symptoms:  No hypoglycemia at this time  Current diabetes treatment:   He is using the OmniPod 5 insulin pump with Humalog insulin.  He is using approximately 65 units of insulin each day    Blood glucose device:  Dexcom CGM  Blood glucose monitoring frequency:  Continuous per CGM  Blood glucose range/average:  The  14-day sensor report shows an average glucose of 172 mg/dL, with 65% in target range ( mgdL), 22% in the high range (181-250 mg/dL), 12% in the very high range (>250 mg/dL), 0% in the low range (54-70 mg/dL) and 0% in the very low range (<54 mg/dL).   Glucose Source: Device Reviewed  Diet:  Carbohydrate Counting, Limits high carb/sweet foods, Avoids sugary drinks  Activity/Exercise:   He is active with work    Past Medical History:   Diagnosis Date    Pilonidal cyst     Type 1 diabetes     BG RUNS AROUND 160 IN AM- PT HAS A DEXACOM AND INSULIN PUMP     Past Surgical History:   Procedure Laterality Date    EAR TUBES      PILONIDAL CYSTECTOMY N/A 3/6/2025    Procedure: Pilonidal cyst excision-surgically excise pilonidal cyst;  Surgeon: Kishan Curiel MD;  Location: [facility] OR;  Service: General;  Laterality: N/A;     Family History   Problem Relation Age of Onset    Diabetes type I Other     Diabetes type II Other      Social History     Socioeconomic History    Marital status:     Number of children: 2   Tobacco Use    Smoking status: Former     Current packs/day: 0.00     Types: Cigarettes     Quit date: 2019     Years since quittin.2    Smokeless tobacco: Current     Types: Snuff   Vaping Use    Vaping status: Former    Start date: 10/1/2023    Substances: Flavoring    Devices: Refillable tank   Substance and Sexual Activity    Alcohol use: Not Currently    Drug use: Never    Sexual activity: Defer     Partners: Female     Comment: /wife      No Known Allergies    Current Outpatient Medications:     acetaminophen (TYLENOL) 500 MG tablet, Take 1 tablet by mouth Every 6 (Six) Hours As Needed for Mild Pain (prn for head aches and pther minor pain)., Disp: , Rfl:     Continuous Glucose Sensor (Dexcom G6 Sensor), Use Every 10 (Ten) Days., Disp: 3 each, Rfl: 11    Continuous Glucose Transmitter (Dexcom G6 Transmitter) misc, 1 EACH EVERY 3 MONTHS., Disp: 1 each, Rfl: 3    Glucagon (Gvoke  "HypoPen 2-Pack) 0.5 MG/0.1ML solution auto-injector, Inject 0.5 mg under the skin into the appropriate area as directed As Needed (use for severe hypoglycemia)., Disp: 0.1 mL, Rfl: 3    Insulin Disposable Pump (Omnipod 5 [id number] Pods Gen 5) misc, Use 1 each Every Other Day., Disp: 15 each, Rfl: 5    Insulin Lispro (HumaLOG) 100 UNIT/ML injection, Inject 100 Units under the skin into the appropriate area as directed Daily for 180 days., Disp: 30 mL, Rfl: 5    HYDROcodone-acetaminophen (NORCO) 5-325 MG per tablet, Take 1 tablet by mouth Every 6 (Six) Hours As Needed for Moderate Pain (Pain). (Patient not taking: Reported on 4/4/2025), Disp: 10 tablet, Rfl: 0    Objective     Vitals:    04/04/25 0823   BP: 123/85   BP Location: Right arm   Patient Position: Sitting   Cuff Size: Adult   Pulse: 78   SpO2: 98%   Weight: 88.5 kg (195 lb)   Height: 182.9 cm (72\")     Body mass index is 26.45 kg/m².    Physical Exam  Constitutional:       Appearance: Normal appearance.      Comments: Overweight (BMI 25 - 29.9) Pt Current BMI = 26.45    HENT:      Head: Normocephalic and atraumatic.      Right Ear: External ear normal.      Left Ear: External ear normal.      Nose: Nose normal.   Eyes:      Extraocular Movements: Extraocular movements intact.      Conjunctiva/sclera: Conjunctivae normal.   Pulmonary:      Effort: Pulmonary effort is normal.   Musculoskeletal:         General: Normal range of motion.      Cervical back: Normal range of motion.   Skin:     General: Skin is warm and dry.   Neurological:      General: No focal deficit present.      Mental Status: He is alert and oriented to person, place, and time. Mental status is at baseline.   Psychiatric:         Mood and Affect: Mood normal.         Behavior: Behavior normal.         Thought Content: Thought content normal.         Judgment: Judgment normal.             Result Review :   The following data was reviewed by: KENJI Girard on 04/04/2025:    Most " Recent A1C          4/4/2025    08:36   HGBA1C Most Recent   Hemoglobin A1C 6.9        A1C Last 3 Results          10/8/2024    08:47 1/9/2025    08:54 4/4/2025    08:36   HGBA1C Last 3 Results   Hemoglobin A1C 7.0  6.9  6.9      A1c collected in the office today is 6.9%, indicating Controlled Type I diabetes.  This result is unchanged from the prior result of 6.9% collected on 1/9/24     Glucose   Date Value Ref Range Status   04/04/2025 119 (H) 70 - 99 mg/dL Final     Comment:     Serial Number: [id number]:  [id number]     Point of care glucose in the office today is within normal limits for nonfasting glucose    Creatinine   Date Value Ref Range Status   02/13/2025 1.10 0.76 - 1.27 mg/dL Final   04/02/2020 0.89 0.70 - 1.20 mg/dL Final     eGFR   Date Value Ref Range Status   02/13/2025 90.3 >60.0 mL/min/1.73 Final     Labs collected on 2/13/25 show Normal values              Diagnoses and all orders for this visit:    1. Controlled diabetes mellitus type 1 without complications (Primary)  -     POC Glycosylated Hemoglobin (Hb A1C)  -     POC Glucose  -     Continuous Glucose Sensor (Dexcom G6 Sensor); Use Every 10 (Ten) Days.  Dispense: 3 each; Refill: 11  -     Insulin Lispro (HumaLOG) 100 UNIT/ML injection; Inject 100 Units under the skin into the appropriate area as directed Daily for 180 days.  Dispense: 30 mL; Refill: 5    2. Insulin pump in place    3. Insulin pump titration    4. Uses self-applied continuous glucose monitoring device    5. Overweight (BMI 25.0-29.9)        Assessment & Plan  1. Diabetes Mellitus.  His average glucose level is 172, with 65% of readings within the target range. Postprandial hyperglycemia has been observed. The current carbohydrate ratio is set at 10, and the A1c level remains at 6.9, consistent with the reading from January 2025. The insulin sensitivity is currently set at 35, and the target glucose level is 120. The active insulin time on the pump will be adjusted  from 3 hours to 2 hours to facilitate earlier correction. Additionally, the insulin sensitivity will be reduced from 35 to 30 to allow for earlier correction based on both insulin duration and glucose level. He has been advised to revert to the previous settings if hypoglycemia occurs. A demonstration of the Sugar Pixel device was provided, and he was encouraged to explore its features on the official website. Refills for sensors and insulin have been provided through [facility] pharmacy.        The patient will monitor his blood glucose levels per continuous glucose monitor.  If he develops problematic hyperglycemia or hypoglycemia or adverse drug reactions, he will contact the office for further instructions.        Follow Up     Return in about 3 months (around 7/4/2025) for Pump Eval, CGM Follow-up.    Patient was given instructions and counseling regarding his condition or for health maintenance advice. Please see specific information pulled into the AVS if appropriate.     Kym Alfaro, KENJI  04/04/2025        Dictated Utilizing Dragon Dictation.  Please note that portions of this note were completed with a voice recognition program.  Part of this note may be an electronic transcription/translation of spoken language to printed text using the Dragon Dictation System.

## 2025-04-03 NOTE — TELEPHONE ENCOUNTER
"Left message on patients voicemail on 04/02/25 @ 11:25 am as well as 04/03/25 @ 8:48 am requesting a call back. We received FMLA paperwork for him and we need a signed WOOD and the $25 forms fee before it can be filled out and submitted. Patients paperwork is in the blue \"Incomplete FMLA\" folder at [place]'s desk.  "

## 2025-04-03 NOTE — TELEPHONE ENCOUNTER
Patient called back and said that he does not need [place] paperwork filled out and that we can disregard.

## 2025-04-04 ENCOUNTER — OFFICE VISIT (OUTPATIENT)
Dept: DIABETES SERVICES | Facility: HOSPITAL | Age: 35
End: 2025-04-04
Payer: COMMERCIAL

## 2025-04-04 VITALS
DIASTOLIC BLOOD PRESSURE: 85 MMHG | SYSTOLIC BLOOD PRESSURE: 123 MMHG | WEIGHT: 195 LBS | BODY MASS INDEX: 26.41 KG/M2 | OXYGEN SATURATION: 98 % | HEART RATE: 78 BPM | HEIGHT: 72 IN

## 2025-04-04 DIAGNOSIS — Z46.81 INSULIN PUMP TITRATION: ICD-10-CM

## 2025-04-04 DIAGNOSIS — Z96.41 INSULIN PUMP IN PLACE: ICD-10-CM

## 2025-04-04 DIAGNOSIS — E66.3 OVERWEIGHT (BMI 25.0-29.9): ICD-10-CM

## 2025-04-04 DIAGNOSIS — E10.9 CONTROLLED DIABETES MELLITUS TYPE 1 WITHOUT COMPLICATIONS: Primary | ICD-10-CM

## 2025-04-04 DIAGNOSIS — Z97.8 USES SELF-APPLIED CONTINUOUS GLUCOSE MONITORING DEVICE: ICD-10-CM

## 2025-04-04 LAB
EXPIRATION DATE: ABNORMAL
GLUCOSE BLDC GLUCOMTR-MCNC: 119 MG/DL (ref 70–99)
HBA1C MFR BLD: 6.9 % (ref 4.5–5.7)
Lab: ABNORMAL

## 2025-04-04 PROCEDURE — G0463 HOSPITAL OUTPT CLINIC VISIT: HCPCS | Performed by: NURSE PRACTITIONER

## 2025-04-04 PROCEDURE — 83036 HEMOGLOBIN GLYCOSYLATED A1C: CPT | Performed by: NURSE PRACTITIONER

## 2025-04-04 PROCEDURE — 82948 REAGENT STRIP/BLOOD GLUCOSE: CPT | Performed by: NURSE PRACTITIONER

## 2025-04-04 RX ORDER — INSULIN LISPRO 100 [IU]/ML
100 INJECTION, SOLUTION INTRAVENOUS; SUBCUTANEOUS DAILY
Qty: 30 ML | Refills: 5 | Status: SHIPPED | OUTPATIENT
Start: 2025-04-04 | End: 2025-10-01

## 2025-04-04 RX ORDER — PROCHLORPERAZINE 25 MG/1
SUPPOSITORY RECTAL
Qty: 3 EACH | Refills: 11 | Status: SHIPPED | OUTPATIENT
Start: 2025-04-04

## 2025-07-03 ENCOUNTER — PRIOR AUTHORIZATION (OUTPATIENT)
Dept: DIABETES SERVICES | Facility: HOSPITAL | Age: 35
End: 2025-07-03
Payer: COMMERCIAL

## 2025-07-03 ENCOUNTER — TELEPHONE (OUTPATIENT)
Dept: DIABETES SERVICES | Facility: HOSPITAL | Age: 35
End: 2025-07-03

## 2025-07-03 NOTE — TELEPHONE ENCOUNTER
PA request received from [facility] for the following medication    Insulin Disposable Pump (Omnipod 5 [id number] Pods Gen 5) misc (01/09/2025)     Key: [id number]

## 2025-07-03 NOTE — TELEPHONE ENCOUNTER
PA approved    This approval is effective from 07/03/2025 until 07/03/2026.     ENDOCRINOLOGY - SCAN - Omnipod 5 pods approval letter (07/03/2025)

## 2025-07-03 NOTE — TELEPHONE ENCOUNTER
Caller: Moy Granados    Relationship to patient: Self    Best call back number: [phone]     Patient is needing: PT RX OF OMNIPOD 5 INSULIN PUMP REQUIRES PRIOR AUTH FOR FULFILLMENT. PLEASE CALL TO CONFIRM WHEN PLACED.

## 2025-07-06 NOTE — PROGRESS NOTES
Chief Complaint  Diabetes (Med management, A1C, CGM/Pump Eval)    Referred By: No Known Provider    Subjective          Patient or patient representative verbalized consent for the use of Ambient Listening during the visit with  KENJI Girard for chart documentation. 7/7/2025  07:37 EDT    Moy Granados presents to [facility] for insulin pump management    History of Present Illness    Visit type:  follow-up  Diabetes type:  Type 1  Current diabetes status/concerns/issues:     History of Present Illness  The patient presents for evaluation of type 1 diabetes.    He is currently using an OmniPod insulin pump for his diabetes management. He recently experienced a malfunction with his sensor, which led to its removal. The issue was reported to the , and he is awaiting a replacement. He was without the sensor for several days. He reports no new health concerns.      Current Diabetes symptoms:    Polyuria: No   Polydipsia: No   Polyphagia: No   Blurred vision: No   Excessive fatigue: No  Known Diabetes complications:  Neuropathy: None; Location: N/A  Renal: Normal eGFR per current labs and Microalbuminuria - NEGATIVE  Eyes: No current eye exam available in record; Location: N/A  Amputation/Wounds: None  GI: None  Cardiovascular: None  ED: None  Other: None  Hypoglycemia:  None reported at this time  Hypoglycemia Symptoms:  No hypoglycemia at this time  Current diabetes treatment:  He is using the OmniPod 5 insulin pump with Humalog insulin.  He is using approximately 65 units of insulin each day    Blood glucose device:  Dexcom CGM  Blood glucose monitoring frequency:  Continuous per CGM  Blood glucose range/average:  The 14-day sensor report shows an average glucose of 200 mg/dL, with 51% in target range ( mgdL), 23% in the high range (181-250 mg/dL), 26% in the very high range (>250 mg/dL), 0% in the low range (54-70 mg/dL) and 0% in the very low range  (<54 mg/dL).   Glucose Source: Device Reviewed  Diet:  Carbohydrate Counting, Limits high carb/sweet foods, Avoids sugary drinks  Activity/Exercise:  active with work    Past Medical History:   Diagnosis Date    Pilonidal cyst     Type 1 diabetes     BG RUNS AROUND 160 IN AM- PT HAS A DEXACOM AND INSULIN PUMP     Past Surgical History:   Procedure Laterality Date    EAR TUBES      PILONIDAL CYSTECTOMY N/A 3/6/2025    Procedure: Pilonidal cyst excision-surgically excise pilonidal cyst;  Surgeon: Kishan Curiel MD;  Location: [facility] OR;  Service: General;  Laterality: N/A;     Family History   Problem Relation Age of Onset    Diabetes type I Other     Diabetes type II Other      Social History     Socioeconomic History    Marital status:     Number of children: 2   Tobacco Use    Smoking status: Former     Current packs/day: 0.00     Types: Cigarettes     Quit date:      Years since quittin.5    Smokeless tobacco: Current     Types: Snuff   Vaping Use    Vaping status: Former    Start date: 10/1/2023    Substances: Flavoring    Devices: Refillable tank   Substance and Sexual Activity    Alcohol use: Not Currently    Drug use: Never    Sexual activity: Defer     Partners: Female     Comment: /wife      No Known Allergies    Current Outpatient Medications:     acetaminophen (TYLENOL) 500 MG tablet, Take 1 tablet by mouth Every 6 (Six) Hours As Needed for Mild Pain (prn for head aches and pther minor pain)., Disp: , Rfl:     Continuous Glucose Sensor (Dexcom G6 Sensor), Use Every 10 (Ten) Days., Disp: 3 each, Rfl: 11    Continuous Glucose Transmitter (Dexcom G6 Transmitter) misc, 1 EACH EVERY 3 MONTHS., Disp: 1 each, Rfl: 3    Glucagon (Gvoke HypoPen 2-Pack) 0.5 MG/0.1ML solution auto-injector, Inject 0.5 mg under the skin into the appropriate area as directed As Needed (use for severe hypoglycemia)., Disp: 0.1 mL, Rfl: 3    Insulin Disposable Pump (Omnipod 5 [id number] Pods Gen 5) misc, Use 1  "each Every Other Day., Disp: 15 each, Rfl: 5    Insulin Lispro (HumaLOG) 100 UNIT/ML injection, Inject 100 Units under the skin into the appropriate area as directed Daily for 180 days., Disp: 30 mL, Rfl: 5    Objective     Vitals:    07/07/25 0718   BP: 110/73   BP Location: Left arm   Patient Position: Sitting   Cuff Size: Adult   Pulse: 81   SpO2: 97%   Weight: 89.8 kg (198 lb)   Height: 182.9 cm (72\")     Body mass index is 26.85 kg/m².    Physical Exam  Constitutional:       Appearance: Normal appearance.      Comments: Overweight (BMI 25 - 29.9) Pt Current BMI = 26.85    HENT:      Head: Normocephalic and atraumatic.      Right Ear: External ear normal.      Left Ear: External ear normal.      Nose: Nose normal.   Eyes:      Extraocular Movements: Extraocular movements intact.      Conjunctiva/sclera: Conjunctivae normal.   Pulmonary:      Effort: Pulmonary effort is normal.   Musculoskeletal:         General: Normal range of motion.      Cervical back: Normal range of motion.   Skin:     General: Skin is warm and dry.   Neurological:      General: No focal deficit present.      Mental Status: He is alert and oriented to person, place, and time. Mental status is at baseline.   Psychiatric:         Mood and Affect: Mood normal.         Behavior: Behavior normal.         Thought Content: Thought content normal.         Judgment: Judgment normal.             Result Review :   The following data was reviewed by: KENJI Girard on 07/07/2025:    Most Recent A1C          7/7/2025    07:28   HGBA1C Most Recent   Hemoglobin A1C 7.0        A1C Last 3 Results          1/9/2025    08:54 4/4/2025    08:36 7/7/2025    07:28   HGBA1C Last 3 Results   Hemoglobin A1C 6.9  6.9  7.0      A1c collected in the office today is 7%, indicating Controlled Type I diabetes.  This result is up from the prior result of 6.9% collected on 4/4/25     Glucose   Date Value Ref Range Status   07/07/2025 205 (H) 70 - 99 mg/dL Final "     Comment:     Serial Number: [id number]:  [id number]     Point of care glucose in the office today is elevated at 205 mg/dL                Diagnoses and all orders for this visit:    1. Controlled diabetes mellitus type 1 without complications (Primary)  -     POC Glycosylated Hemoglobin (Hb A1C)  -     POC Glucose  -     Insulin Disposable Pump (Omnipod 5 [id number] Pods Gen 5) misc; Use 1 each Every Other Day.  Dispense: 15 each; Refill: 5    2. Insulin pump in place    3. Uses self-applied continuous glucose monitoring device    4. Overweight (BMI 25.0-29.9)        Assessment & Plan  1. Type 1 diabetes mellitus  - His average glucose level is 200, with only 51% of readings within the target range. This data is based on approximately 7 days of sensor readings out of a possible 14, due to a period of time without the sensor.  - He has been in automated mode for about 54% of the time. His A1c level has increased to 7.0 from 6.9 in 04/2025, indicating that while his diabetes is still under good control, there is room for improvement.  - He was advised to ensure his sensor remains active and to accurately input his carbohydrate intake, particularly when consuming meals with significant carbohydrate content.  - A prescription for OmniPod insulin pump pods will be sent to pharmacy.        The patient will monitor his blood glucose levels per continuous glucose monitor.  If he develops problematic hyperglycemia or hypoglycemia or adverse drug reactions, he will contact the office for further instructions.        Follow Up     Return in about 3 months (around 10/7/2025) for Pump Eval, CGM Follow-up.    Patient was given instructions and counseling regarding his condition or for health maintenance advice. Please see specific information pulled into the AVS if appropriate.     Kym Alfaro, KENJI  07/07/2025        Dictated Utilizing Dragon Dictation.  Please note that portions of this note were completed with a  voice recognition program.  Part of this note may be an electronic transcription/translation of spoken language to printed text using the Dragon Dictation System.

## 2025-07-07 ENCOUNTER — OFFICE VISIT (OUTPATIENT)
Dept: DIABETES SERVICES | Facility: HOSPITAL | Age: 35
End: 2025-07-07
Payer: COMMERCIAL

## 2025-07-07 VITALS
HEIGHT: 72 IN | BODY MASS INDEX: 26.82 KG/M2 | DIASTOLIC BLOOD PRESSURE: 73 MMHG | HEART RATE: 81 BPM | WEIGHT: 198 LBS | OXYGEN SATURATION: 97 % | SYSTOLIC BLOOD PRESSURE: 110 MMHG

## 2025-07-07 DIAGNOSIS — E66.3 OVERWEIGHT (BMI 25.0-29.9): ICD-10-CM

## 2025-07-07 DIAGNOSIS — Z96.41 INSULIN PUMP IN PLACE: ICD-10-CM

## 2025-07-07 DIAGNOSIS — E10.9 CONTROLLED DIABETES MELLITUS TYPE 1 WITHOUT COMPLICATIONS: Primary | ICD-10-CM

## 2025-07-07 DIAGNOSIS — Z97.8 USES SELF-APPLIED CONTINUOUS GLUCOSE MONITORING DEVICE: ICD-10-CM

## 2025-07-07 LAB
EXPIRATION DATE: ABNORMAL
GLUCOSE BLDC GLUCOMTR-MCNC: 205 MG/DL (ref 70–99)
HBA1C MFR BLD: 7 % (ref 4.5–5.7)
Lab: ABNORMAL

## 2025-07-07 PROCEDURE — 82948 REAGENT STRIP/BLOOD GLUCOSE: CPT | Performed by: NURSE PRACTITIONER

## 2025-07-07 PROCEDURE — G0463 HOSPITAL OUTPT CLINIC VISIT: HCPCS | Performed by: NURSE PRACTITIONER

## 2025-07-07 PROCEDURE — 99214 OFFICE O/P EST MOD 30 MIN: CPT | Performed by: NURSE PRACTITIONER

## 2025-07-07 PROCEDURE — 83036 HEMOGLOBIN GLYCOSYLATED A1C: CPT | Performed by: NURSE PRACTITIONER

## 2025-07-07 PROCEDURE — 95251 CONT GLUC MNTR ANALYSIS I&R: CPT | Performed by: NURSE PRACTITIONER

## 2025-07-07 RX ORDER — INSULIN PMP CART,AUT,G6/7,CNTR
1 EACH SUBCUTANEOUS
Qty: 15 EACH | Refills: 5 | Status: SHIPPED | OUTPATIENT
Start: 2025-07-07

## 2025-08-01 DIAGNOSIS — E10.9 CONTROLLED DIABETES MELLITUS TYPE 1 WITHOUT COMPLICATIONS: ICD-10-CM

## 2025-08-01 RX ORDER — INSULIN LISPRO 100 [IU]/ML
INJECTION, SOLUTION INTRAVENOUS; SUBCUTANEOUS
Qty: 30 ML | Refills: 5 | Status: SHIPPED | OUTPATIENT
Start: 2025-08-01

## (undated) DEVICE — INTENDED FOR TISSUE SEPARATION, AND OTHER PROCEDURES THAT REQUIRE A SHARP SURGICAL BLADE TO PUNCTURE OR CUT.: Brand: BARD-PARKER ® CARBON RIB-BACK BLADES

## (undated) DEVICE — GOWN,SIRUS,POLYRNF,BRTHSLV,2XL,18/CS: Brand: MEDLINE

## (undated) DEVICE — ADHS LIQ MASTISOL 2/3ML

## (undated) DEVICE — THE STERILE LIGHT HANDLE COVER IS USED WITH STERIS SURGICAL LIGHTING AND VISUALIZATION SYSTEMS.

## (undated) DEVICE — PENCL SMOKE/EVAC MEGADYNE TELESCP 10FT

## (undated) DEVICE — SKIN PREP TRAY W/CHG: Brand: MEDLINE INDUSTRIES, INC.

## (undated) DEVICE — SOL IRR NACL 0.9PCT BO 1000ML

## (undated) DEVICE — MAJOR-LF: Brand: MEDLINE INDUSTRIES, INC.

## (undated) DEVICE — SYR LL TP 10ML STRL

## (undated) DEVICE — GLV SURG SENSICARE PI ORTHO SZ8 LF STRL

## (undated) DEVICE — DRSNG PAD ABD 8X10IN STRL

## (undated) DEVICE — SLV SCD KN/LEN ADJ EXPRSS BLENDED MD 1P/U

## (undated) DEVICE — STERILE POLYISOPRENE POWDER-FREE SURGICAL GLOVES WITH EMOLLIENT COATING: Brand: PROTEXIS